# Patient Record
Sex: FEMALE | Race: WHITE | NOT HISPANIC OR LATINO
[De-identification: names, ages, dates, MRNs, and addresses within clinical notes are randomized per-mention and may not be internally consistent; named-entity substitution may affect disease eponyms.]

---

## 2017-05-10 ENCOUNTER — APPOINTMENT (OUTPATIENT)
Dept: FAMILY MEDICINE | Facility: CLINIC | Age: 82
End: 2017-05-10

## 2017-05-10 VITALS
OXYGEN SATURATION: 99 % | DIASTOLIC BLOOD PRESSURE: 72 MMHG | SYSTOLIC BLOOD PRESSURE: 122 MMHG | HEART RATE: 78 BPM | WEIGHT: 156 LBS | BODY MASS INDEX: 25.07 KG/M2 | HEIGHT: 66 IN | RESPIRATION RATE: 16 BRPM

## 2017-05-17 ENCOUNTER — LABORATORY RESULT (OUTPATIENT)
Age: 82
End: 2017-05-17

## 2017-05-18 LAB
25(OH)D3 SERPL-MCNC: 38.8 NG/ML
ALBUMIN SERPL ELPH-MCNC: 4.3 G/DL
ALP BLD-CCNC: 69 U/L
ALT SERPL-CCNC: 21 U/L
ANION GAP SERPL CALC-SCNC: 12 MMOL/L
APPEARANCE: CLEAR
AST SERPL-CCNC: 25 U/L
BASOPHILS # BLD AUTO: 0.03 K/UL
BASOPHILS NFR BLD AUTO: 0.6 %
BILIRUB SERPL-MCNC: 0.4 MG/DL
BILIRUBIN URINE: NEGATIVE
BLOOD URINE: NEGATIVE
BUN SERPL-MCNC: 16 MG/DL
CALCIUM SERPL-MCNC: 9.4 MG/DL
CHLORIDE SERPL-SCNC: 101 MMOL/L
CHOLEST SERPL-MCNC: 140 MG/DL
CHOLEST/HDLC SERPL: 2.3 RATIO
CO2 SERPL-SCNC: 28 MMOL/L
COLOR: YELLOW
CREAT SERPL-MCNC: 0.8 MG/DL
EOSINOPHIL # BLD AUTO: 0.15 K/UL
EOSINOPHIL NFR BLD AUTO: 2.8 %
GLUCOSE QUALITATIVE U: NORMAL MG/DL
GLUCOSE SERPL-MCNC: 118 MG/DL
HBA1C MFR BLD HPLC: 6.4 %
HCT VFR BLD CALC: 40.9 %
HDLC SERPL-MCNC: 61 MG/DL
HGB BLD-MCNC: 12.9 G/DL
IMM GRANULOCYTES NFR BLD AUTO: 0.2 %
KETONES URINE: NEGATIVE
LDLC SERPL CALC-MCNC: 61 MG/DL
LEUKOCYTE ESTERASE URINE: ABNORMAL
LYMPHOCYTES # BLD AUTO: 1.56 K/UL
LYMPHOCYTES NFR BLD AUTO: 28.9 %
MAN DIFF?: NORMAL
MCHC RBC-ENTMCNC: 29.1 PG
MCHC RBC-ENTMCNC: 31.5 GM/DL
MCV RBC AUTO: 92.1 FL
MONOCYTES # BLD AUTO: 0.32 K/UL
MONOCYTES NFR BLD AUTO: 5.9 %
NEUTROPHILS # BLD AUTO: 3.33 K/UL
NEUTROPHILS NFR BLD AUTO: 61.6 %
NITRITE URINE: NEGATIVE
PH URINE: 6.5
PLATELET # BLD AUTO: 269 K/UL
POTASSIUM SERPL-SCNC: 4.5 MMOL/L
PROT SERPL-MCNC: 7.2 G/DL
PROTEIN URINE: NEGATIVE MG/DL
RBC # BLD: 4.44 M/UL
RBC # FLD: 13.7 %
SODIUM SERPL-SCNC: 141 MMOL/L
SPECIFIC GRAVITY URINE: 1.02
T4 SERPL-MCNC: 6.6 UG/DL
TRIGL SERPL-MCNC: 91 MG/DL
TSH SERPL-ACNC: 2.55 UIU/ML
URATE SERPL-MCNC: 5.7 MG/DL
UROBILINOGEN URINE: NORMAL MG/DL
WBC # FLD AUTO: 5.4 K/UL

## 2017-11-28 ENCOUNTER — NON-APPOINTMENT (OUTPATIENT)
Age: 82
End: 2017-11-28

## 2017-11-28 ENCOUNTER — APPOINTMENT (OUTPATIENT)
Dept: FAMILY MEDICINE | Facility: CLINIC | Age: 82
End: 2017-11-28
Payer: MEDICARE

## 2017-11-28 VITALS
WEIGHT: 163 LBS | HEIGHT: 66 IN | RESPIRATION RATE: 14 BRPM | HEART RATE: 78 BPM | OXYGEN SATURATION: 97 % | BODY MASS INDEX: 26.2 KG/M2 | SYSTOLIC BLOOD PRESSURE: 120 MMHG | DIASTOLIC BLOOD PRESSURE: 70 MMHG

## 2017-11-28 DIAGNOSIS — H61.21 IMPACTED CERUMEN, RIGHT EAR: ICD-10-CM

## 2017-11-28 DIAGNOSIS — R09.82 POSTNASAL DRIP: ICD-10-CM

## 2017-11-28 PROCEDURE — 36415 COLL VENOUS BLD VENIPUNCTURE: CPT

## 2017-11-28 PROCEDURE — G0439: CPT

## 2017-11-28 PROCEDURE — 99214 OFFICE O/P EST MOD 30 MIN: CPT | Mod: 25

## 2017-11-28 PROCEDURE — 93000 ELECTROCARDIOGRAM COMPLETE: CPT | Mod: 59

## 2017-11-28 PROCEDURE — 69209 REMOVE IMPACTED EAR WAX UNI: CPT

## 2017-11-29 ENCOUNTER — RESULT REVIEW (OUTPATIENT)
Age: 82
End: 2017-11-29

## 2017-11-30 LAB
ALBUMIN SERPL ELPH-MCNC: 4.3 G/DL
ALP BLD-CCNC: 85 U/L
ALT SERPL-CCNC: 26 U/L
ANION GAP SERPL CALC-SCNC: 13 MMOL/L
APPEARANCE: CLEAR
AST SERPL-CCNC: 21 U/L
BASOPHILS # BLD AUTO: 0.03 K/UL
BASOPHILS NFR BLD AUTO: 0.5 %
BILIRUB SERPL-MCNC: 0.4 MG/DL
BILIRUBIN URINE: NEGATIVE
BLOOD URINE: NEGATIVE
BUN SERPL-MCNC: 14 MG/DL
CALCIUM SERPL-MCNC: 9.7 MG/DL
CHLORIDE SERPL-SCNC: 99 MMOL/L
CHOLEST SERPL-MCNC: 163 MG/DL
CHOLEST/HDLC SERPL: 2.5 RATIO
CO2 SERPL-SCNC: 29 MMOL/L
COLOR: YELLOW
CREAT SERPL-MCNC: 0.91 MG/DL
EOSINOPHIL # BLD AUTO: 0.19 K/UL
EOSINOPHIL NFR BLD AUTO: 3 %
GLUCOSE QUALITATIVE U: NEGATIVE MG/DL
GLUCOSE SERPL-MCNC: 140 MG/DL
HBA1C MFR BLD HPLC: 6.3 %
HCT VFR BLD CALC: 43.3 %
HDLC SERPL-MCNC: 65 MG/DL
HGB BLD-MCNC: 13.6 G/DL
IMM GRANULOCYTES NFR BLD AUTO: 0.2 %
KETONES URINE: NEGATIVE
LDLC SERPL CALC-MCNC: 75 MG/DL
LEUKOCYTE ESTERASE URINE: NEGATIVE
LYMPHOCYTES # BLD AUTO: 1.75 K/UL
LYMPHOCYTES NFR BLD AUTO: 27.9 %
MAN DIFF?: NORMAL
MCHC RBC-ENTMCNC: 30 PG
MCHC RBC-ENTMCNC: 31.4 GM/DL
MCV RBC AUTO: 95.6 FL
MONOCYTES # BLD AUTO: 0.43 K/UL
MONOCYTES NFR BLD AUTO: 6.9 %
NEUTROPHILS # BLD AUTO: 3.86 K/UL
NEUTROPHILS NFR BLD AUTO: 61.5 %
NITRITE URINE: NEGATIVE
PH URINE: 7.5
PLATELET # BLD AUTO: 273 K/UL
POTASSIUM SERPL-SCNC: 4.8 MMOL/L
PROT SERPL-MCNC: 7.7 G/DL
PROTEIN URINE: NEGATIVE MG/DL
RBC # BLD: 4.53 M/UL
RBC # FLD: 14.5 %
SODIUM SERPL-SCNC: 141 MMOL/L
SPECIFIC GRAVITY URINE: 1.01
TRIGL SERPL-MCNC: 117 MG/DL
UROBILINOGEN URINE: NEGATIVE MG/DL
WBC # FLD AUTO: 6.27 K/UL

## 2018-07-09 ENCOUNTER — APPOINTMENT (OUTPATIENT)
Dept: FAMILY MEDICINE | Facility: CLINIC | Age: 83
End: 2018-07-09
Payer: MEDICARE

## 2018-07-09 VITALS
WEIGHT: 149 LBS | RESPIRATION RATE: 16 BRPM | TEMPERATURE: 98.2 F | DIASTOLIC BLOOD PRESSURE: 64 MMHG | OXYGEN SATURATION: 97 % | HEART RATE: 96 BPM | SYSTOLIC BLOOD PRESSURE: 114 MMHG | BODY MASS INDEX: 23.95 KG/M2 | HEIGHT: 66 IN

## 2018-07-09 DIAGNOSIS — J18.9 PNEUMONIA, UNSPECIFIED ORGANISM: ICD-10-CM

## 2018-07-09 DIAGNOSIS — N95.0 POSTMENOPAUSAL BLEEDING: ICD-10-CM

## 2018-07-09 PROCEDURE — 99495 TRANSJ CARE MGMT MOD F2F 14D: CPT

## 2018-07-09 NOTE — HISTORY OF PRESENT ILLNESS
[Post-hospitalization from ___ Hospital] : Post-hospitalization from [unfilled] Hospital [Admitted on: ___] : The patient was admitted on [unfilled] [Discharged on ___] : discharged on [unfilled] [Discharge Summary] : discharge summary [Patient Contacted By: ____] : and contacted by [unfilled] [FreeTextEntry2] : admitted with fever, noted to have Pneumonia? suspicious for malignancy. Seen by Pulmonologist Dr Vergara today. Probably pneumonia, will rpt CT in 4 weeks. completed antibiotics\par Bacterial vaginosis treated in hospital. Bloody vaginal discharge has appointment with DR Pierre.

## 2018-08-06 ENCOUNTER — RX RENEWAL (OUTPATIENT)
Age: 83
End: 2018-08-06

## 2018-08-13 ENCOUNTER — RX RENEWAL (OUTPATIENT)
Age: 83
End: 2018-08-13

## 2018-08-14 ENCOUNTER — RX RENEWAL (OUTPATIENT)
Age: 83
End: 2018-08-14

## 2018-09-13 ENCOUNTER — APPOINTMENT (OUTPATIENT)
Dept: FAMILY MEDICINE | Facility: CLINIC | Age: 83
End: 2018-09-13
Payer: MEDICARE

## 2018-09-13 VITALS
BODY MASS INDEX: 24.91 KG/M2 | WEIGHT: 155 LBS | SYSTOLIC BLOOD PRESSURE: 140 MMHG | RESPIRATION RATE: 16 BRPM | OXYGEN SATURATION: 98 % | HEIGHT: 66 IN | HEART RATE: 100 BPM | DIASTOLIC BLOOD PRESSURE: 70 MMHG

## 2018-09-13 DIAGNOSIS — M79.652 PAIN IN LEFT THIGH: ICD-10-CM

## 2018-09-13 PROCEDURE — 36415 COLL VENOUS BLD VENIPUNCTURE: CPT

## 2018-09-13 PROCEDURE — 99214 OFFICE O/P EST MOD 30 MIN: CPT | Mod: 25

## 2018-09-13 RX ORDER — FLUTICASONE PROPIONATE 50 UG/1
50 SPRAY, METERED NASAL
Qty: 1 | Refills: 1 | Status: DISCONTINUED | COMMUNITY
Start: 2017-11-28 | End: 2018-09-13

## 2018-09-13 NOTE — HISTORY OF PRESENT ILLNESS
[Paroxysmal] : paroxysmal [Moderate] : moderate [Heat] : heat [At Night] : at night [Stable] : stable [de-identified] : left thigh pain [FreeTextEntry8] : Left thigh cramping for 1 month. Seen by Chiropractor not improving.\par Increases at night\par

## 2018-09-13 NOTE — PHYSICAL EXAM

## 2018-09-13 NOTE — REVIEW OF SYSTEMS
[Joint Stiffness] : no joint stiffness [Joint Swelling] : no joint swelling [Muscle Weakness] : no muscle weakness [Muscle Pain] : muscle pain [Back Pain] : back pain [Negative] : Heme/Lymph

## 2018-09-17 LAB
ALBUMIN SERPL ELPH-MCNC: 4.2 G/DL
ANION GAP SERPL CALC-SCNC: 11 MMOL/L
BUN SERPL-MCNC: 15 MG/DL
CALCIUM SERPL-MCNC: 9.6 MG/DL
CHLORIDE SERPL-SCNC: 101 MMOL/L
CO2 SERPL-SCNC: 27 MMOL/L
CREAT SERPL-MCNC: 0.9 MG/DL
GLUCOSE SERPL-MCNC: 193 MG/DL
PHOSPHATE SERPL-MCNC: 3.5 MG/DL
POTASSIUM SERPL-SCNC: 4.5 MMOL/L
SODIUM SERPL-SCNC: 139 MMOL/L

## 2018-11-01 ENCOUNTER — RX RENEWAL (OUTPATIENT)
Age: 83
End: 2018-11-01

## 2018-12-03 ENCOUNTER — NON-APPOINTMENT (OUTPATIENT)
Age: 83
End: 2018-12-03

## 2018-12-03 ENCOUNTER — LABORATORY RESULT (OUTPATIENT)
Age: 83
End: 2018-12-03

## 2018-12-03 ENCOUNTER — APPOINTMENT (OUTPATIENT)
Dept: FAMILY MEDICINE | Facility: CLINIC | Age: 83
End: 2018-12-03
Payer: MEDICARE

## 2018-12-03 VITALS
DIASTOLIC BLOOD PRESSURE: 76 MMHG | SYSTOLIC BLOOD PRESSURE: 118 MMHG | BODY MASS INDEX: 20.41 KG/M2 | RESPIRATION RATE: 16 BRPM | WEIGHT: 127 LBS | HEART RATE: 83 BPM | TEMPERATURE: 97.9 F | OXYGEN SATURATION: 96 % | HEIGHT: 66 IN

## 2018-12-03 LAB — CYTOLOGY CVX/VAG DOC THIN PREP: NORMAL

## 2018-12-03 PROCEDURE — G0439: CPT

## 2018-12-03 PROCEDURE — G0444 DEPRESSION SCREEN ANNUAL: CPT | Mod: 59

## 2018-12-03 PROCEDURE — 99215 OFFICE O/P EST HI 40 MIN: CPT

## 2018-12-03 PROCEDURE — 36415 COLL VENOUS BLD VENIPUNCTURE: CPT

## 2018-12-03 PROCEDURE — 93000 ELECTROCARDIOGRAM COMPLETE: CPT | Mod: 59

## 2018-12-03 RX ORDER — GABAPENTIN 300 MG/1
300 CAPSULE ORAL
Qty: 60 | Refills: 0 | Status: DISCONTINUED | COMMUNITY
Start: 2018-09-13 | End: 2018-12-03

## 2018-12-03 NOTE — HEALTH RISK ASSESSMENT
[Excellent] : ~his/her~  mood as  excellent [No falls in past year] : Patient reported no falls in the past year [0] : 2) Feeling down, depressed, or hopeless: Not at all (0) [Patient reported colonoscopy was normal] : Patient reported colonoscopy was normal [HIV Test offered] : HIV Test offered [Hepatitis C test offered] : Hepatitis C test offered [None] : None [Alone] : lives alone [Retired] : retired [Single] : single [Sexually Active] : sexually active [Feels Safe at Home] : Feels safe at home [Fully functional (bathing, dressing, toileting, transferring, walking, feeding)] : Fully functional (bathing, dressing, toileting, transferring, walking, feeding) [Fully functional (using the telephone, shopping, preparing meals, housekeeping, doing laundry, using] : Fully functional and needs no help or supervision to perform IADLs (using the telephone, shopping, preparing meals, housekeeping, doing laundry, using transportation, managing medications and managing finances) [Reports changes in hearing] : Reports changes in hearing [Smoke Detector] : smoke detector [Carbon Monoxide Detector] : carbon monoxide detector [Seat Belt] :  uses seat belt [Sunscreen] : uses sunscreen [Discussed at today's visit] : Advance Directives Discussed at today's visit [Designated Healthcare Proxy] : Designated healthcare proxy [Name: ___] : Health Care Proxy's Name: [unfilled]  [Relationship: ___] : Relationship: [unfilled] [] : No [Change in mental status noted] : No change in mental status noted [Language] : denies difficulty with language [High Risk Behavior] : no high risk behavior [Reports changes in vision] : Reports no changes in vision [TB Exposure] : is not being exposed to tuberculosis [MammogramComments] : not a candidate for screening [PapSmearComments] : not a candidate for screening [ColonoscopyDate] : 2/2014 [de-identified] : uses hearing aide

## 2018-12-03 NOTE — HISTORY OF PRESENT ILLNESS
[FreeTextEntry1] : here for an annual [de-identified] : 83 yr old female is here for an annual\par Knee pain resolved with Naproxen, seen by Ortho Dr Guan\par Seen by GYN for discharge, exam and imaging normal. Breast cancer screening.\par Was advised to see urologist, did not go.\par Seen by Pulmonary Dr Vergara. Annual monitoring

## 2018-12-10 LAB
25(OH)D3 SERPL-MCNC: 29.5 NG/ML
ALBUMIN SERPL ELPH-MCNC: 4.4 G/DL
ALP BLD-CCNC: 89 U/L
ALT SERPL-CCNC: 24 U/L
ANION GAP SERPL CALC-SCNC: 12 MMOL/L
APPEARANCE: CLEAR
AST SERPL-CCNC: 21 U/L
BASOPHILS # BLD AUTO: 0.03 K/UL
BASOPHILS NFR BLD AUTO: 0.4 %
BILIRUB SERPL-MCNC: 0.3 MG/DL
BILIRUBIN URINE: NEGATIVE
BLOOD URINE: NEGATIVE
BUN SERPL-MCNC: 15 MG/DL
CALCIUM SERPL-MCNC: 9.9 MG/DL
CHLORIDE SERPL-SCNC: 99 MMOL/L
CHOLEST SERPL-MCNC: 147 MG/DL
CHOLEST/HDLC SERPL: 2.4 RATIO
CO2 SERPL-SCNC: 27 MMOL/L
COLOR: YELLOW
CREAT SERPL-MCNC: 0.87 MG/DL
EOSINOPHIL # BLD AUTO: 0.18 K/UL
EOSINOPHIL NFR BLD AUTO: 2.2 %
GLUCOSE QUALITATIVE U: NEGATIVE MG/DL
GLUCOSE SERPL-MCNC: 100 MG/DL
HBA1C MFR BLD HPLC: 6.6 %
HCT VFR BLD CALC: 40.9 %
HCV AB SER QL: NONREACTIVE
HCV S/CO RATIO: 0.27 S/CO
HDLC SERPL-MCNC: 62 MG/DL
HGB BLD-MCNC: 13 G/DL
HIV1+2 AB SPEC QL IA.RAPID: NONREACTIVE
IMM GRANULOCYTES NFR BLD AUTO: 0.2 %
KETONES URINE: NEGATIVE
LDLC SERPL CALC-MCNC: 59 MG/DL
LEUKOCYTE ESTERASE URINE: ABNORMAL
LYMPHOCYTES # BLD AUTO: 1.99 K/UL
LYMPHOCYTES NFR BLD AUTO: 24.5 %
MAN DIFF?: NORMAL
MCHC RBC-ENTMCNC: 28.8 PG
MCHC RBC-ENTMCNC: 31.8 GM/DL
MCV RBC AUTO: 90.7 FL
MONOCYTES # BLD AUTO: 0.56 K/UL
MONOCYTES NFR BLD AUTO: 6.9 %
NEUTROPHILS # BLD AUTO: 5.33 K/UL
NEUTROPHILS NFR BLD AUTO: 65.8 %
NITRITE URINE: NEGATIVE
PH URINE: 5.5
PLATELET # BLD AUTO: 354 K/UL
POTASSIUM SERPL-SCNC: 4.3 MMOL/L
PROT SERPL-MCNC: 7.8 G/DL
PROTEIN URINE: NEGATIVE MG/DL
RBC # BLD: 4.51 M/UL
RBC # FLD: 14.1 %
SODIUM SERPL-SCNC: 138 MMOL/L
SPECIFIC GRAVITY URINE: 1.01
T4 SERPL-MCNC: 6.8 UG/DL
TRIGL SERPL-MCNC: 131 MG/DL
TSH SERPL-ACNC: 3 UIU/ML
URATE SERPL-MCNC: 5.4 MG/DL
UROBILINOGEN URINE: NEGATIVE MG/DL
WBC # FLD AUTO: 8.11 K/UL

## 2019-02-07 ENCOUNTER — RX RENEWAL (OUTPATIENT)
Age: 84
End: 2019-02-07

## 2019-04-26 ENCOUNTER — APPOINTMENT (OUTPATIENT)
Dept: FAMILY MEDICINE | Facility: CLINIC | Age: 84
End: 2019-04-26
Payer: MEDICARE

## 2019-04-26 VITALS
OXYGEN SATURATION: 96 % | TEMPERATURE: 98.1 F | RESPIRATION RATE: 16 BRPM | WEIGHT: 156 LBS | DIASTOLIC BLOOD PRESSURE: 70 MMHG | BODY MASS INDEX: 25.07 KG/M2 | HEIGHT: 66 IN | SYSTOLIC BLOOD PRESSURE: 112 MMHG | HEART RATE: 78 BPM

## 2019-04-26 PROCEDURE — 99214 OFFICE O/P EST MOD 30 MIN: CPT

## 2019-04-26 RX ORDER — CHROMIUM 200 MCG
TABLET ORAL
Refills: 0 | Status: ACTIVE | COMMUNITY

## 2019-04-27 NOTE — PHYSICAL EXAM
[No Acute Distress] : no acute distress [Normal Sclera/Conjunctiva] : normal sclera/conjunctiva [Well Developed] : well developed [Well Nourished] : well nourished [EOMI] : extraocular movements intact [PERRL] : pupils equal round and reactive to light [Normal Oropharynx] : the oropharynx was normal [Normal Outer Ear/Nose] : the outer ears and nose were normal in appearance [No Lymphadenopathy] : no lymphadenopathy [Normal TMs] : both tympanic membranes were normal [Supple] : supple [Clear to Auscultation] : lungs were clear to auscultation bilaterally [No Respiratory Distress] : no respiratory distress  [Normal Rate] : normal rate  [No Accessory Muscle Use] : no accessory muscle use [Regular Rhythm] : with a regular rhythm [Normal S1, S2] : normal S1 and S2 [Soft] : abdomen soft [Non Tender] : non-tender [Non-distended] : non-distended [No Rash] : no rash [Normal Bowel Sounds] : normal bowel sounds [Grossly Normal Strength/Tone] : grossly normal strength/tone [Normal Gait] : normal gait [No Focal Deficits] : no focal deficits [Normal Affect] : the affect was normal [Normal Insight/Judgement] : insight and judgment were intact

## 2019-04-27 NOTE — HISTORY OF PRESENT ILLNESS
[FreeTextEntry8] : Patient is here today for an acute visit to discuss blood in her urine. \par She initially thought it was coming from her vagina but she had a check up with her GYN. \par She had a pap, sonogram, etc which did not show anything. \par She saw her GI doctor as well and he was not concerned. \par She was seen by a urologist in December and was not happy with the outcome. He did urine cytology which showed gross blood.  \par She has not had blood for awhile now but still has gas from her vagina.  She occasionally gets brown discharge but this is intermittent. \par No pain with urination or trouble urinating. \par While she was in [place] she went to see a gastroenterologist in March.  She had a CT scan and barium enema.  He found diverticula but nothing that would be the cause for her current symptoms. She is following up with her GI doctor on Monday. \par About one week ago, she had another episode of a clot of blood in the urine and increased air production.  She went to urgent care last week and had urine studies done which showed blood.

## 2019-04-27 NOTE — ASSESSMENT
[FreeTextEntry1] : Hematuria/ gas from urethra\par - symptoms for over one year\par - reviewed results of cytology from urologist - negative for cancer cells\par - reviewed CT of abdomen ordered by GI - incidental hepatic and renal cyst finding\par - going for GI follow up next week and likely repeat colonoscopy\par - recent urgent care visit showed blood in the urine\par - advised patient to follow up with urology for further testing

## 2019-05-13 ENCOUNTER — RX RENEWAL (OUTPATIENT)
Age: 84
End: 2019-05-13

## 2019-05-14 ENCOUNTER — APPOINTMENT (OUTPATIENT)
Dept: COLORECTAL SURGERY | Facility: CLINIC | Age: 84
End: 2019-05-14
Payer: MEDICARE

## 2019-05-14 VITALS
DIASTOLIC BLOOD PRESSURE: 74 MMHG | HEART RATE: 76 BPM | TEMPERATURE: 98.1 F | SYSTOLIC BLOOD PRESSURE: 124 MMHG | HEIGHT: 66 IN | BODY MASS INDEX: 25.07 KG/M2 | WEIGHT: 156 LBS

## 2019-05-14 DIAGNOSIS — K57.92 DIVERTICULITIS OF INTESTINE, PART UNSPECIFIED, W/OUT PERFORATION OR ABSCESS W/OUT BLEEDING: ICD-10-CM

## 2019-05-14 DIAGNOSIS — Z82.49 FAMILY HISTORY OF ISCHEMIC HEART DISEASE AND OTHER DISEASES OF THE CIRCULATORY SYSTEM: ICD-10-CM

## 2019-05-14 DIAGNOSIS — Z82.3 FAMILY HISTORY OF STROKE: ICD-10-CM

## 2019-05-14 DIAGNOSIS — Z87.448 PERSONAL HISTORY OF OTHER DISEASES OF URINARY SYSTEM: ICD-10-CM

## 2019-05-14 PROCEDURE — 99205 OFFICE O/P NEW HI 60 MIN: CPT

## 2019-05-14 NOTE — ASSESSMENT
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 Recommend laparoscopic possible open sigmoid colon resection possible ostomy with repair of colovaginal and colovesical fistulas.Risk and benefits of the surgery have been discussed which include bleeding, infection, sepsis, multiorgan failure, inadvertent injury including hollow viscus, solid organ, ureter neurovascular and neurological structures, DVT PE, heart attack, stroke, hernias, recurrence, leakage of anastomosis requiring ostomy and death.

## 2019-05-14 NOTE — HISTORY OF PRESENT ILLNESS
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 Symptoms and present for several months and worsening denies any fevers or chills nausea or vomiting

## 2019-05-14 NOTE — CONSULT LETTER
[Dear  ___] : Dear  [unfilled], [Consult Letter:] : I had the pleasure of evaluating your patient, [unfilled]. [Please see my note below.] : Please see my note below. [Consult Closing:] : Thank you very much for allowing me to participate in the care of this patient.  If you have any questions, please do not hesitate to contact me. [Sincerely,] : Sincerely, [FreeTextEntry3] : Demetrius Knight M.D. FACS, FASCRS

## 2019-05-14 NOTE — REVIEW OF SYSTEMS
[Abdominal Pain] : abdominal pain [Dysuria] : dysuria [Vaginal Discharge] : vaginal discharge [Pelvic Pain] : pelvic pain [Negative] : Heme/Lymph [FreeTextEntry8] : Pneumaturia

## 2019-05-14 NOTE — PHYSICAL EXAM
[Abdomen Masses] : No abdominal masses [Abdomen Tenderness] : ~T No ~M abdominal tenderness [Tender] : nontender [JVD] : no jugular venous distention  [Normal Breath Sounds] : Normal breath sounds [Normal Heart Sounds] : normal heart sounds [Normal Rate and Rhythm] : normal rate and rhythm [No Rash or Lesion] : No rash or lesion [Alert] : alert [Oriented to Person] : oriented to person [Oriented to Place] : oriented to place [Oriented to Time] : oriented to time [Calm] : calm [de-identified] : Looks well in no distress, of stated age. [de-identified] : moves all 4 extremities appropriately with 5 over 5 strength [de-identified] : pupils equal reactive to light normocephalic atraumatic.

## 2019-05-14 NOTE — DATA REVIEWED
[FreeTextEntry1] : CAT scan demonstrates sigmoid diverticulitis and colovesical and colovaginal fistula

## 2019-05-20 ENCOUNTER — APPOINTMENT (OUTPATIENT)
Dept: FAMILY MEDICINE | Facility: CLINIC | Age: 84
End: 2019-05-20
Payer: MEDICARE

## 2019-05-20 VITALS
SYSTOLIC BLOOD PRESSURE: 110 MMHG | WEIGHT: 158.5 LBS | BODY MASS INDEX: 25.47 KG/M2 | DIASTOLIC BLOOD PRESSURE: 60 MMHG | HEIGHT: 66 IN | OXYGEN SATURATION: 97 % | RESPIRATION RATE: 16 BRPM | HEART RATE: 86 BPM

## 2019-05-20 DIAGNOSIS — N32.1 VESICOINTESTINAL FISTULA: ICD-10-CM

## 2019-05-20 DIAGNOSIS — N82.4 OTHER FEMALE INTESTINAL-GENITAL TRACT FISTULAE: ICD-10-CM

## 2019-05-20 PROCEDURE — G0444 DEPRESSION SCREEN ANNUAL: CPT | Mod: 59

## 2019-05-20 PROCEDURE — 99214 OFFICE O/P EST MOD 30 MIN: CPT | Mod: 25

## 2019-06-04 ENCOUNTER — OUTPATIENT (OUTPATIENT)
Dept: OUTPATIENT SERVICES | Facility: HOSPITAL | Age: 84
LOS: 1 days | Discharge: HOME | End: 2019-06-04
Payer: MEDICARE

## 2019-06-04 VITALS
OXYGEN SATURATION: 97 % | RESPIRATION RATE: 16 BRPM | DIASTOLIC BLOOD PRESSURE: 66 MMHG | HEIGHT: 66 IN | WEIGHT: 155.43 LBS | SYSTOLIC BLOOD PRESSURE: 134 MMHG | TEMPERATURE: 97 F | HEART RATE: 64 BPM

## 2019-06-04 DIAGNOSIS — Z90.710 ACQUIRED ABSENCE OF BOTH CERVIX AND UTERUS: Chronic | ICD-10-CM

## 2019-06-04 DIAGNOSIS — Z98.890 OTHER SPECIFIED POSTPROCEDURAL STATES: Chronic | ICD-10-CM

## 2019-06-04 DIAGNOSIS — K57.92 DIVERTICULITIS OF INTESTINE, PART UNSPECIFIED, WITHOUT PERFORATION OR ABSCESS WITHOUT BLEEDING: ICD-10-CM

## 2019-06-04 DIAGNOSIS — Z90.49 ACQUIRED ABSENCE OF OTHER SPECIFIED PARTS OF DIGESTIVE TRACT: Chronic | ICD-10-CM

## 2019-06-04 DIAGNOSIS — N82.4 OTHER FEMALE INTESTINAL-GENITAL TRACT FISTULAE: ICD-10-CM

## 2019-06-04 DIAGNOSIS — N32.1 VESICOINTESTINAL FISTULA: ICD-10-CM

## 2019-06-04 DIAGNOSIS — Z41.9 ENCOUNTER FOR PROCEDURE FOR PURPOSES OTHER THAN REMEDYING HEALTH STATE, UNSPECIFIED: Chronic | ICD-10-CM

## 2019-06-04 LAB
ABO RH CONFIRMATION: SIGNIFICANT CHANGE UP
ALBUMIN SERPL ELPH-MCNC: 3.7 G/DL — SIGNIFICANT CHANGE UP (ref 3.3–5)
ALP SERPL-CCNC: 86 U/L — SIGNIFICANT CHANGE UP (ref 40–120)
ALT FLD-CCNC: 36 U/L — SIGNIFICANT CHANGE UP (ref 12–78)
ANION GAP SERPL CALC-SCNC: 5 MMOL/L — SIGNIFICANT CHANGE UP (ref 5–17)
APTT BLD: 31 SEC — SIGNIFICANT CHANGE UP (ref 27.5–36.3)
AST SERPL-CCNC: 20 U/L — SIGNIFICANT CHANGE UP (ref 15–37)
BASOPHILS # BLD AUTO: 0.05 K/UL — SIGNIFICANT CHANGE UP (ref 0–0.2)
BASOPHILS NFR BLD AUTO: 0.9 % — SIGNIFICANT CHANGE UP (ref 0–2)
BILIRUB DIRECT SERPL-MCNC: <0.1 MG/DL — SIGNIFICANT CHANGE UP (ref 0–0.2)
BILIRUB SERPL-MCNC: 0.5 MG/DL — SIGNIFICANT CHANGE UP (ref 0.2–1.2)
BLD GP AB SCN SERPL QL: SIGNIFICANT CHANGE UP
BUN SERPL-MCNC: 14 MG/DL — SIGNIFICANT CHANGE UP (ref 7–23)
CALCIUM SERPL-MCNC: 9.7 MG/DL — SIGNIFICANT CHANGE UP (ref 8.5–10.1)
CEA SERPL-MCNC: 2.9 NG/ML — SIGNIFICANT CHANGE UP (ref 0–3.8)
CHLORIDE SERPL-SCNC: 103 MMOL/L — SIGNIFICANT CHANGE UP (ref 96–108)
CO2 SERPL-SCNC: 31 MMOL/L — SIGNIFICANT CHANGE UP (ref 22–31)
CREAT SERPL-MCNC: 0.83 MG/DL — SIGNIFICANT CHANGE UP (ref 0.5–1.3)
EOSINOPHIL # BLD AUTO: 0.11 K/UL — SIGNIFICANT CHANGE UP (ref 0–0.5)
EOSINOPHIL NFR BLD AUTO: 1.9 % — SIGNIFICANT CHANGE UP (ref 0–6)
GLUCOSE SERPL-MCNC: 78 MG/DL — SIGNIFICANT CHANGE UP (ref 70–99)
HBA1C BLD-MCNC: 6.4 % — HIGH (ref 4–5.6)
HCT VFR BLD CALC: 41.5 % — SIGNIFICANT CHANGE UP (ref 34.5–45)
HGB BLD-MCNC: 13.4 G/DL — SIGNIFICANT CHANGE UP (ref 11.5–15.5)
IMM GRANULOCYTES NFR BLD AUTO: 0.3 % — SIGNIFICANT CHANGE UP (ref 0–1.5)
INR BLD: 1.03 RATIO — SIGNIFICANT CHANGE UP (ref 0.88–1.16)
LYMPHOCYTES # BLD AUTO: 1.39 K/UL — SIGNIFICANT CHANGE UP (ref 1–3.3)
LYMPHOCYTES # BLD AUTO: 23.6 % — SIGNIFICANT CHANGE UP (ref 13–44)
MCHC RBC-ENTMCNC: 29.8 PG — SIGNIFICANT CHANGE UP (ref 27–34)
MCHC RBC-ENTMCNC: 32.3 GM/DL — SIGNIFICANT CHANGE UP (ref 32–36)
MCV RBC AUTO: 92.4 FL — SIGNIFICANT CHANGE UP (ref 80–100)
MONOCYTES # BLD AUTO: 0.51 K/UL — SIGNIFICANT CHANGE UP (ref 0–0.9)
MONOCYTES NFR BLD AUTO: 8.7 % — SIGNIFICANT CHANGE UP (ref 2–14)
NEUTROPHILS # BLD AUTO: 3.8 K/UL — SIGNIFICANT CHANGE UP (ref 1.8–7.4)
NEUTROPHILS NFR BLD AUTO: 64.6 % — SIGNIFICANT CHANGE UP (ref 43–77)
PLATELET # BLD AUTO: 267 K/UL — SIGNIFICANT CHANGE UP (ref 150–400)
POTASSIUM SERPL-MCNC: 4.1 MMOL/L — SIGNIFICANT CHANGE UP (ref 3.5–5.3)
POTASSIUM SERPL-SCNC: 4.1 MMOL/L — SIGNIFICANT CHANGE UP (ref 3.5–5.3)
PROT SERPL-MCNC: 7.8 GM/DL — SIGNIFICANT CHANGE UP (ref 6–8.3)
PROTHROM AB SERPL-ACNC: 11.5 SEC — SIGNIFICANT CHANGE UP (ref 10–12.9)
RBC # BLD: 4.49 M/UL — SIGNIFICANT CHANGE UP (ref 3.8–5.2)
RBC # FLD: 13.1 % — SIGNIFICANT CHANGE UP (ref 10.3–14.5)
SODIUM SERPL-SCNC: 139 MMOL/L — SIGNIFICANT CHANGE UP (ref 135–145)
TYPE + AB SCN PNL BLD: SIGNIFICANT CHANGE UP
WBC # BLD: 5.88 K/UL — SIGNIFICANT CHANGE UP (ref 3.8–10.5)
WBC # FLD AUTO: 5.88 K/UL — SIGNIFICANT CHANGE UP (ref 3.8–10.5)

## 2019-06-04 PROCEDURE — 71046 X-RAY EXAM CHEST 2 VIEWS: CPT | Mod: 26

## 2019-06-04 PROCEDURE — 93010 ELECTROCARDIOGRAM REPORT: CPT

## 2019-06-04 NOTE — H&P PST ADULT - NSICDXPASTMEDICALHX_GEN_ALL_CORE_FT
PAST MEDICAL HISTORY:  Abnormal EKG     Constipation     Diverticulosis     HLD (hyperlipidemia)     HTN (hypertension)

## 2019-06-04 NOTE — H&P PST ADULT - NSICDXPASTSURGICALHX_GEN_ALL_CORE_FT
PAST SURGICAL HISTORY:  Elective surgery Left meniscus repair 2015    H/O colonoscopy 2015 was negative    H/O cystoscopy 12/2018    History of cholecystectomy about 2013    S/P hysterectomy 1974

## 2019-06-04 NOTE — H&P PST ADULT - HISTORY OF PRESENT ILLNESS
This is a 85 y/o female with a PMH of HTN and HLD who reports for the last year she has had vaginal or urethral discharge and passing of gas through her urethra.  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 She is now scheduled for a laparoscopic possible open sigmoid colon resection and repair of colovaginal and colovesical fistulas. She denies any abdominal pain, N/V/D but does have a hx of constipation Denies any blood in her stool. This is a 83 y/o female with a PMH of HTN and HLD who reports for the last year she has had vaginal or urethral discharge and passing of gas through her urethra.  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 She is now scheduled for a laparoscopic possible open sigmoid colon resection and repair of colovaginal and colovesical fistulas. She denies any abdominal pain, N/V/D but does have a hx of constipation. Denies any blood in her stool.

## 2019-06-04 NOTE — H&P PST ADULT - NSICDXFAMILYHX_GEN_ALL_CORE_FT
FAMILY HISTORY:  FH: heart failure, mother who passed away at age 93  FH: lung cancer, father who passed away at age 76

## 2019-06-04 NOTE — H&P PST ADULT - ASSESSMENT
This is an 85 y/o female a colovaginal and colovesical fistulas who is scheduled for a laparoscopic possible open sigmoid colon resection repair of colovaginal and colovesical fistulas     Patient instructed on     1. NPO post midnight of surgery  2. On the use of EZ sponges  3. Aware that she needs medical clearance (reports she had clearance with PCP (Dr. Reyes) and cardiologist (Dr. Welch))   4. May take Metoprolol with a sip of water on morning of procedure     CAPRINI SCORE    AGE RELATED RISK FACTORS                                                       MOBILITY RELATED FACTORS  [ ] Age 41-60 years                                            (1 Point)                  [ ] Bed rest                                                        (1 Point)  [ ] Age: 61-74 years                                           (2 Points)                [ ] Plaster cast                                                   (2 Points)  X  Age= 75 years                                              (3 Points)                 [ ] Bed bound for more than 72 hours                   (2 Points)    DISEASE RELATED RISK FACTORS                                               GENDER SPECIFIC FACTORS  [ ] Edema in the lower extremities                       (1 Point)                  [ ] Pregnancy                                                     (1 Point)  [ ] Varicose veins                                               (1 Point)                  [ ] Post-partum < 6 weeks                                   (1 Point)             X  BMI > 25 Kg/m2                                            (1 Point)                  [ ] Hormonal therapy  or oral contraception            (1 Point)                 [ ] Sepsis (in the previous month)                        (1 Point)                  [ ] History of pregnancy complications  [ ] Pneumonia or serious lung disease                                               [ ] Unexplained or recurrent                       (1 Point)           (in the previous month)                               (1 Point)  [ ] Abnormal pulmonary function test                     (1 Point)                 SURGERY RELATED RISK FACTORS  [ ] Acute myocardial infarction                              (1 Point)                 [ ]  Section                                            (1 Point)  [ ] Congestive heart failure (in the previous month)  (1 Point)                 [ ] Minor surgery                                                 (1 Point)   [ ] Inflammatory bowel disease                             (1 Point)                 [ ] Arthroscopic surgery                                        (2 Points)  [ ] Central venous access                                    (2 Points)                 X  General surgery lasting more than 45 minutes   (2 Points)       [ ] Stroke (in the previous month)                          (5 Points)               [ ] Elective arthroplasty                                        (5 Points)            [ ] malignancy                                                             (2 points)                                                                                                                                 HEMATOLOGY RELATED FACTORS                                                 TRAUMA RELATED RISK FACTORS  [ ] Prior episodes of VTE                                     (3 Points)                 [ ] Fracture of the hip, pelvis, or leg                       (5 Points)  [ ] Positive family history for VTE                         (3 Points)                 [ ] Acute spinal cord injury (in the previous month)  (5 Points)  [ ] Prothrombin 25681 A                                      (3 Points)                 [ ] Paralysis  (less than 1 month)                          (5 Points)  [ ] Factor V Leiden                                             (3 Points)                 [ ] Multiple Trauma within 1 month                         (5 Points)  [ ] Lupus anticoagulants                                     (3 Points)                                                           [ ] Anticardiolipin antibodies                                (3 Points)                                                       [ ] High homocysteine in the blood                      (3 Points)                                             [ ] Other congenital or acquired thrombophilia       (3 Points)                                                [ ] Heparin induced thrombocytopenia                  (3 Points)                                          Total Score: 6 This is an 85 y/o female a colovaginal and colovesical fistulas who is scheduled for a laparoscopic possible open sigmoid colon resection repair of colovaginal and colovesical fistulas     Patient instructed on     1. NPO post midnight of surgery  2. On the use of EZ sponges  3. Aware that she needs medical clearance (reports she had clearance with PCP (Dr. Reyes) and cardiologist (Dr. Welch))   4. May take Metoprolol with a sip of water on morning of procedure   5. Pt to ask her PCP (Dr. Reyes) or surgeon (Dr. Saha) if she needs to hold her low dose baby aspirin prior to procedure     CAPRINI SCORE    AGE RELATED RISK FACTORS                                                       MOBILITY RELATED FACTORS  [ ] Age 41-60 years                                            (1 Point)                  [ ] Bed rest                                                        (1 Point)  [ ] Age: 61-74 years                                           (2 Points)                [ ] Plaster cast                                                   (2 Points)  X  Age= 75 years                                              (3 Points)                 [ ] Bed bound for more than 72 hours                   (2 Points)    DISEASE RELATED RISK FACTORS                                               GENDER SPECIFIC FACTORS  [ ] Edema in the lower extremities                       (1 Point)                  [ ] Pregnancy                                                     (1 Point)  [ ] Varicose veins                                               (1 Point)                  [ ] Post-partum < 6 weeks                                   (1 Point)             X  BMI > 25 Kg/m2                                            (1 Point)                  [ ] Hormonal therapy  or oral contraception            (1 Point)                 [ ] Sepsis (in the previous month)                        (1 Point)                  [ ] History of pregnancy complications  [ ] Pneumonia or serious lung disease                                               [ ] Unexplained or recurrent                       (1 Point)           (in the previous month)                               (1 Point)  [ ] Abnormal pulmonary function test                     (1 Point)                 SURGERY RELATED RISK FACTORS  [ ] Acute myocardial infarction                              (1 Point)                 [ ]  Section                                            (1 Point)  [ ] Congestive heart failure (in the previous month)  (1 Point)                 [ ] Minor surgery                                                 (1 Point)   [ ] Inflammatory bowel disease                             (1 Point)                 [ ] Arthroscopic surgery                                        (2 Points)  [ ] Central venous access                                    (2 Points)                 X  General surgery lasting more than 45 minutes   (2 Points)       [ ] Stroke (in the previous month)                          (5 Points)               [ ] Elective arthroplasty                                        (5 Points)            [ ] malignancy                                                             (2 points)                                                                                                                                 HEMATOLOGY RELATED FACTORS                                                 TRAUMA RELATED RISK FACTORS  [ ] Prior episodes of VTE                                     (3 Points)                 [ ] Fracture of the hip, pelvis, or leg                       (5 Points)  [ ] Positive family history for VTE                         (3 Points)                 [ ] Acute spinal cord injury (in the previous month)  (5 Points)  [ ] Prothrombin 94525 A                                      (3 Points)                 [ ] Paralysis  (less than 1 month)                          (5 Points)  [ ] Factor V Leiden                                             (3 Points)                 [ ] Multiple Trauma within 1 month                         (5 Points)  [ ] Lupus anticoagulants                                     (3 Points)                                                           [ ] Anticardiolipin antibodies                                (3 Points)                                                       [ ] High homocysteine in the blood                      (3 Points)                                             [ ] Other congenital or acquired thrombophilia       (3 Points)                                                [ ] Heparin induced thrombocytopenia                  (3 Points)                                          Total Score: 6

## 2019-06-07 RX ORDER — SODIUM CHLORIDE 9 MG/ML
3 INJECTION INTRAMUSCULAR; INTRAVENOUS; SUBCUTANEOUS EVERY 8 HOURS
Refills: 0 | Status: DISCONTINUED | OUTPATIENT
Start: 2019-06-12 | End: 2019-06-16

## 2019-06-07 RX ORDER — HEPARIN SODIUM 5000 [USP'U]/ML
5000 INJECTION INTRAVENOUS; SUBCUTANEOUS ONCE
Refills: 0 | Status: COMPLETED | OUTPATIENT
Start: 2019-06-12 | End: 2019-06-12

## 2019-06-10 NOTE — ASSESSMENT
[FreeTextEntry4] : Pt is medically optimized for procedure at this time. Pt has hx of nonobstructive CAD, medically managed. Pt has obtained cardiac clearance from her cardiologist Dr. Welch. Pt has hx of HTN, montior BP perioperatively. [Patient Optimized for Surgery] : Patient optimized for surgery

## 2019-06-10 NOTE — HISTORY OF PRESENT ILLNESS
[Coronary Artery Disease] : coronary artery disease [No Pertinent Pulmonary History] : no history of asthma, COPD, sleep apnea, or smoking [No Adverse Anesthesia Reaction] : no adverse anesthesia reaction in self or family member [Diabetes] : diabetes [(Patient denies any chest pain, claudication, dyspnea on exertion, orthopnea, palpitations or syncope)] : Patient denies any chest pain, claudication, dyspnea on exertion, orthopnea, palpitations or syncope [Good (7-10 METs)] : Good (7-10 METs) [FreeTextEntry1] : Laparoscopic possible open sigmoid colon resection possible ostomy repair of colovaginal and vesicovaginal fistula [FreeTextEntry2] : 6/12/19 [FreeTextEntry3] : Antonia [FreeTextEntry4] : Pt in office for medical clearance. Pt to go for procedure on 6/12/19, to be performed by Dr. Knight. Pt denies chest pain, palpitations, dyspnea, fever, chills, nausea, or vomiting. Pt has hx of CAD, pt to see cardio Dr. Nata Welch. \par PSTs to be done in coming weeks.

## 2019-06-10 NOTE — RESULTS/DATA
[] : results reviewed [de-identified] : EKG- NSR 63bpm LAD, RBBB, nonspecific T wave inversions [de-identified] : hba1c 6.4

## 2019-06-11 ENCOUNTER — OTHER (OUTPATIENT)
Age: 84
End: 2019-06-11

## 2019-06-11 RX ORDER — FAMOTIDINE 10 MG/ML
20 INJECTION INTRAVENOUS ONCE
Refills: 0 | Status: COMPLETED | OUTPATIENT
Start: 2019-06-12 | End: 2019-06-12

## 2019-06-11 RX ORDER — SODIUM CHLORIDE 9 MG/ML
3 INJECTION INTRAMUSCULAR; INTRAVENOUS; SUBCUTANEOUS EVERY 8 HOURS
Refills: 0 | Status: DISCONTINUED | OUTPATIENT
Start: 2019-06-12 | End: 2019-06-16

## 2019-06-11 RX ORDER — ACETAMINOPHEN 500 MG
975 TABLET ORAL ONCE
Refills: 0 | Status: COMPLETED | OUTPATIENT
Start: 2019-06-12 | End: 2019-06-12

## 2019-06-12 ENCOUNTER — INPATIENT (INPATIENT)
Facility: HOSPITAL | Age: 84
LOS: 3 days | Discharge: HOME | End: 2019-06-16
Attending: COLON & RECTAL SURGERY | Admitting: COLON & RECTAL SURGERY
Payer: MEDICARE

## 2019-06-12 ENCOUNTER — RESULT REVIEW (OUTPATIENT)
Age: 84
End: 2019-06-12

## 2019-06-12 ENCOUNTER — APPOINTMENT (OUTPATIENT)
Dept: COLORECTAL SURGERY | Facility: HOSPITAL | Age: 84
End: 2019-06-12
Payer: MEDICARE

## 2019-06-12 VITALS
OXYGEN SATURATION: 98 % | HEIGHT: 66 IN | WEIGHT: 257.94 LBS | DIASTOLIC BLOOD PRESSURE: 59 MMHG | RESPIRATION RATE: 16 BRPM | HEART RATE: 79 BPM | SYSTOLIC BLOOD PRESSURE: 129 MMHG | TEMPERATURE: 98 F

## 2019-06-12 DIAGNOSIS — Z90.710 ACQUIRED ABSENCE OF BOTH CERVIX AND UTERUS: Chronic | ICD-10-CM

## 2019-06-12 DIAGNOSIS — Z41.9 ENCOUNTER FOR PROCEDURE FOR PURPOSES OTHER THAN REMEDYING HEALTH STATE, UNSPECIFIED: Chronic | ICD-10-CM

## 2019-06-12 DIAGNOSIS — Z98.890 OTHER SPECIFIED POSTPROCEDURAL STATES: Chronic | ICD-10-CM

## 2019-06-12 DIAGNOSIS — Z90.49 ACQUIRED ABSENCE OF OTHER SPECIFIED PARTS OF DIGESTIVE TRACT: Chronic | ICD-10-CM

## 2019-06-12 LAB
GLUCOSE BLDC GLUCOMTR-MCNC: 104 MG/DL — HIGH (ref 70–99)
GLUCOSE BLDC GLUCOMTR-MCNC: 114 MG/DL — HIGH (ref 70–99)
GLUCOSE BLDC GLUCOMTR-MCNC: 130 MG/DL — HIGH (ref 70–99)
GLUCOSE BLDC GLUCOMTR-MCNC: 142 MG/DL — HIGH (ref 70–99)

## 2019-06-12 PROCEDURE — 88307 TISSUE EXAM BY PATHOLOGIST: CPT | Mod: 26

## 2019-06-12 PROCEDURE — 88304 TISSUE EXAM BY PATHOLOGIST: CPT | Mod: 26

## 2019-06-12 PROCEDURE — 44207 L COLECTOMY/COLOPROCTOSTOMY: CPT

## 2019-06-12 PROCEDURE — 44661 REPAIR BOWEL-BLADDER FISTULA: CPT | Mod: AS

## 2019-06-12 PROCEDURE — 44213 LAP MOBIL SPLENIC FL ADD-ON: CPT | Mod: AS

## 2019-06-12 PROCEDURE — 44661 REPAIR BOWEL-BLADDER FISTULA: CPT

## 2019-06-12 PROCEDURE — 49020 DRAINAGE ABDOM ABSCESS OPEN: CPT | Mod: 59

## 2019-06-12 PROCEDURE — 45300 PROCTOSIGMOIDOSCOPY DX: CPT

## 2019-06-12 PROCEDURE — 49020 DRAINAGE ABDOM ABSCESS OPEN: CPT | Mod: AS,59

## 2019-06-12 PROCEDURE — 44213 LAP MOBIL SPLENIC FL ADD-ON: CPT

## 2019-06-12 PROCEDURE — 44207 L COLECTOMY/COLOPROCTOSTOMY: CPT | Mod: AS

## 2019-06-12 RX ORDER — CEFOTETAN DISODIUM 1 G
2 VIAL (EA) INJECTION ONCE
Refills: 0 | Status: COMPLETED | OUTPATIENT
Start: 2019-06-13 | End: 2019-06-13

## 2019-06-12 RX ORDER — ATORVASTATIN CALCIUM 80 MG/1
1 TABLET, FILM COATED ORAL
Qty: 0 | Refills: 0 | DISCHARGE

## 2019-06-12 RX ORDER — ACETAMINOPHEN 500 MG
1000 TABLET ORAL EVERY 6 HOURS
Refills: 0 | Status: COMPLETED | OUTPATIENT
Start: 2019-06-13 | End: 2019-06-13

## 2019-06-12 RX ORDER — ONDANSETRON 8 MG/1
4 TABLET, FILM COATED ORAL EVERY 6 HOURS
Refills: 0 | Status: DISCONTINUED | OUTPATIENT
Start: 2019-06-12 | End: 2019-06-16

## 2019-06-12 RX ORDER — ATORVASTATIN CALCIUM 80 MG/1
20 TABLET, FILM COATED ORAL AT BEDTIME
Refills: 0 | Status: DISCONTINUED | OUTPATIENT
Start: 2019-06-12 | End: 2019-06-16

## 2019-06-12 RX ORDER — SODIUM CHLORIDE 9 MG/ML
1000 INJECTION, SOLUTION INTRAVENOUS
Refills: 0 | Status: DISCONTINUED | OUTPATIENT
Start: 2019-06-12 | End: 2019-06-14

## 2019-06-12 RX ORDER — METOPROLOL TARTRATE 50 MG
1 TABLET ORAL
Qty: 0 | Refills: 0 | DISCHARGE

## 2019-06-12 RX ORDER — FENTANYL CITRATE 50 UG/ML
50 INJECTION INTRAVENOUS
Refills: 0 | Status: DISCONTINUED | OUTPATIENT
Start: 2019-06-12 | End: 2019-06-12

## 2019-06-12 RX ORDER — ASPIRIN/CALCIUM CARB/MAGNESIUM 324 MG
1 TABLET ORAL
Qty: 0 | Refills: 0 | DISCHARGE

## 2019-06-12 RX ORDER — OXYCODONE HYDROCHLORIDE 5 MG/1
5 TABLET ORAL EVERY 4 HOURS
Refills: 0 | Status: DISCONTINUED | OUTPATIENT
Start: 2019-06-12 | End: 2019-06-16

## 2019-06-12 RX ORDER — SODIUM CHLORIDE 9 MG/ML
1000 INJECTION, SOLUTION INTRAVENOUS
Refills: 0 | Status: DISCONTINUED | OUTPATIENT
Start: 2019-06-12 | End: 2019-06-12

## 2019-06-12 RX ORDER — METOPROLOL TARTRATE 50 MG
50 TABLET ORAL DAILY
Refills: 0 | Status: DISCONTINUED | OUTPATIENT
Start: 2019-06-12 | End: 2019-06-16

## 2019-06-12 RX ORDER — OXYCODONE HYDROCHLORIDE 5 MG/1
10 TABLET ORAL ONCE
Refills: 0 | Status: DISCONTINUED | OUTPATIENT
Start: 2019-06-12 | End: 2019-06-12

## 2019-06-12 RX ORDER — ACETAMINOPHEN 500 MG
650 TABLET ORAL EVERY 6 HOURS
Refills: 0 | Status: DISCONTINUED | OUTPATIENT
Start: 2019-06-12 | End: 2019-06-16

## 2019-06-12 RX ORDER — EZETIMIBE 10 MG/1
1 TABLET ORAL
Qty: 0 | Refills: 0 | DISCHARGE

## 2019-06-12 RX ORDER — ONDANSETRON 8 MG/1
4 TABLET, FILM COATED ORAL ONCE
Refills: 0 | Status: DISCONTINUED | OUTPATIENT
Start: 2019-06-12 | End: 2019-06-12

## 2019-06-12 RX ORDER — POLYETHYLENE GLYCOL 3350 17 G/17G
1 POWDER, FOR SOLUTION ORAL
Qty: 0 | Refills: 0 | DISCHARGE

## 2019-06-12 RX ORDER — ALVIMOPAN 12 MG/1
12 CAPSULE ORAL ONCE
Refills: 0 | Status: COMPLETED | OUTPATIENT
Start: 2019-06-12 | End: 2019-06-12

## 2019-06-12 RX ORDER — OXYCODONE HYDROCHLORIDE 5 MG/1
5 TABLET ORAL ONCE
Refills: 0 | Status: DISCONTINUED | OUTPATIENT
Start: 2019-06-12 | End: 2019-06-12

## 2019-06-12 RX ORDER — CELECOXIB 200 MG/1
200 CAPSULE ORAL ONCE
Refills: 0 | Status: COMPLETED | OUTPATIENT
Start: 2019-06-12 | End: 2019-06-12

## 2019-06-12 RX ORDER — GABAPENTIN 400 MG/1
600 CAPSULE ORAL ONCE
Refills: 0 | Status: COMPLETED | OUTPATIENT
Start: 2019-06-12 | End: 2019-06-12

## 2019-06-12 RX ORDER — OXYCODONE HYDROCHLORIDE 5 MG/1
10 TABLET ORAL EVERY 4 HOURS
Refills: 0 | Status: DISCONTINUED | OUTPATIENT
Start: 2019-06-12 | End: 2019-06-16

## 2019-06-12 RX ORDER — ALVIMOPAN 12 MG/1
12 CAPSULE ORAL
Refills: 0 | Status: DISCONTINUED | OUTPATIENT
Start: 2019-06-12 | End: 2019-06-16

## 2019-06-12 RX ORDER — CHOLECALCIFEROL (VITAMIN D3) 125 MCG
1 CAPSULE ORAL
Qty: 0 | Refills: 0 | DISCHARGE

## 2019-06-12 RX ORDER — HEPARIN SODIUM 5000 [USP'U]/ML
5000 INJECTION INTRAVENOUS; SUBCUTANEOUS EVERY 12 HOURS
Refills: 0 | Status: DISCONTINUED | OUTPATIENT
Start: 2019-06-13 | End: 2019-06-16

## 2019-06-12 RX ADMIN — Medication 975 MILLIGRAM(S): at 13:36

## 2019-06-12 RX ADMIN — FENTANYL CITRATE 50 MICROGRAM(S): 50 INJECTION INTRAVENOUS at 19:25

## 2019-06-12 RX ADMIN — ALVIMOPAN 12 MILLIGRAM(S): 12 CAPSULE ORAL at 14:01

## 2019-06-12 RX ADMIN — FAMOTIDINE 20 MILLIGRAM(S): 10 INJECTION INTRAVENOUS at 13:36

## 2019-06-12 RX ADMIN — HEPARIN SODIUM 5000 UNIT(S): 5000 INJECTION INTRAVENOUS; SUBCUTANEOUS at 13:35

## 2019-06-12 RX ADMIN — Medication 650 MILLIGRAM(S): at 23:36

## 2019-06-12 RX ADMIN — ATORVASTATIN CALCIUM 20 MILLIGRAM(S): 80 TABLET, FILM COATED ORAL at 23:17

## 2019-06-12 RX ADMIN — Medication 975 MILLIGRAM(S): at 13:38

## 2019-06-12 RX ADMIN — CELECOXIB 200 MILLIGRAM(S): 200 CAPSULE ORAL at 13:36

## 2019-06-12 RX ADMIN — FENTANYL CITRATE 50 MICROGRAM(S): 50 INJECTION INTRAVENOUS at 19:18

## 2019-06-12 RX ADMIN — SODIUM CHLORIDE 75 MILLILITER(S): 9 INJECTION, SOLUTION INTRAVENOUS at 19:18

## 2019-06-12 RX ADMIN — SODIUM CHLORIDE 100 MILLILITER(S): 9 INJECTION, SOLUTION INTRAVENOUS at 22:07

## 2019-06-12 RX ADMIN — OXYCODONE HYDROCHLORIDE 5 MILLIGRAM(S): 5 TABLET ORAL at 19:18

## 2019-06-12 NOTE — BRIEF OPERATIVE NOTE - NSICDXBRIEFPOSTOP_GEN_ALL_CORE_FT
POST-OP DIAGNOSIS:  Pelvic abscess in female 12-Jun-2019 18:40:51  Velasquez Eller  Sigmoid diverticulitis 12-Jun-2019 18:40:02  Velasquez Eller

## 2019-06-12 NOTE — BRIEF OPERATIVE NOTE - NSICDXBRIEFPREOP_GEN_ALL_CORE_FT
PRE-OP DIAGNOSIS:  Sigmoid diverticulitis 12-Jun-2019 18:38:40  Velasquez Eller  Colovesical fistula 12-Jun-2019 18:39:42  Velasquez Eller A

## 2019-06-12 NOTE — BRIEF OPERATIVE NOTE - NSICDXBRIEFPROCEDURE_GEN_ALL_CORE_FT
PROCEDURES:  Repair of colovesical fistula 12-Jun-2019 18:38:07  Velasquez Eller  Rigid proctosigmoidoscopy 12-Jun-2019 18:37:56  Velasquez Eller  Robot-assisted laparoscopic resection of sigmoid colon 12-Jun-2019 18:37:48  Velasquez Eller

## 2019-06-13 PROBLEM — I10 ESSENTIAL (PRIMARY) HYPERTENSION: Chronic | Status: ACTIVE | Noted: 2019-06-04

## 2019-06-13 PROBLEM — E78.5 HYPERLIPIDEMIA, UNSPECIFIED: Chronic | Status: ACTIVE | Noted: 2019-06-04

## 2019-06-13 PROBLEM — K57.90 DIVERTICULOSIS OF INTESTINE, PART UNSPECIFIED, WITHOUT PERFORATION OR ABSCESS WITHOUT BLEEDING: Chronic | Status: ACTIVE | Noted: 2019-06-04

## 2019-06-13 PROBLEM — R94.31 ABNORMAL ELECTROCARDIOGRAM [ECG] [EKG]: Chronic | Status: ACTIVE | Noted: 2019-06-04

## 2019-06-13 PROBLEM — K59.00 CONSTIPATION, UNSPECIFIED: Chronic | Status: ACTIVE | Noted: 2019-06-04

## 2019-06-13 LAB
ANION GAP SERPL CALC-SCNC: 11 MMOL/L — SIGNIFICANT CHANGE UP (ref 5–17)
BASOPHILS # BLD AUTO: 0.01 K/UL — SIGNIFICANT CHANGE UP (ref 0–0.2)
BASOPHILS NFR BLD AUTO: 0.1 % — SIGNIFICANT CHANGE UP (ref 0–2)
BUN SERPL-MCNC: 14 MG/DL — SIGNIFICANT CHANGE UP (ref 7–23)
CALCIUM SERPL-MCNC: 8.7 MG/DL — SIGNIFICANT CHANGE UP (ref 8.5–10.1)
CHLORIDE SERPL-SCNC: 99 MMOL/L — SIGNIFICANT CHANGE UP (ref 96–108)
CO2 SERPL-SCNC: 25 MMOL/L — SIGNIFICANT CHANGE UP (ref 22–31)
CREAT SERPL-MCNC: 0.87 MG/DL — SIGNIFICANT CHANGE UP (ref 0.5–1.3)
EOSINOPHIL # BLD AUTO: 0 K/UL — SIGNIFICANT CHANGE UP (ref 0–0.5)
EOSINOPHIL NFR BLD AUTO: 0 % — SIGNIFICANT CHANGE UP (ref 0–6)
GLUCOSE BLDC GLUCOMTR-MCNC: 109 MG/DL — HIGH (ref 70–99)
GLUCOSE BLDC GLUCOMTR-MCNC: 111 MG/DL — HIGH (ref 70–99)
GLUCOSE BLDC GLUCOMTR-MCNC: 120 MG/DL — HIGH (ref 70–99)
GLUCOSE SERPL-MCNC: 109 MG/DL — HIGH (ref 70–99)
HCT VFR BLD CALC: 35.1 % — SIGNIFICANT CHANGE UP (ref 34.5–45)
HGB BLD-MCNC: 11.8 G/DL — SIGNIFICANT CHANGE UP (ref 11.5–15.5)
IMM GRANULOCYTES NFR BLD AUTO: 0.3 % — SIGNIFICANT CHANGE UP (ref 0–1.5)
LYMPHOCYTES # BLD AUTO: 0.7 K/UL — LOW (ref 1–3.3)
LYMPHOCYTES # BLD AUTO: 7.8 % — LOW (ref 13–44)
MCHC RBC-ENTMCNC: 30.1 PG — SIGNIFICANT CHANGE UP (ref 27–34)
MCHC RBC-ENTMCNC: 33.6 GM/DL — SIGNIFICANT CHANGE UP (ref 32–36)
MCV RBC AUTO: 89.5 FL — SIGNIFICANT CHANGE UP (ref 80–100)
MONOCYTES # BLD AUTO: 0.45 K/UL — SIGNIFICANT CHANGE UP (ref 0–0.9)
MONOCYTES NFR BLD AUTO: 5 % — SIGNIFICANT CHANGE UP (ref 2–14)
NEUTROPHILS # BLD AUTO: 7.8 K/UL — HIGH (ref 1.8–7.4)
NEUTROPHILS NFR BLD AUTO: 86.8 % — HIGH (ref 43–77)
PLATELET # BLD AUTO: 249 K/UL — SIGNIFICANT CHANGE UP (ref 150–400)
POTASSIUM SERPL-MCNC: 3.7 MMOL/L — SIGNIFICANT CHANGE UP (ref 3.5–5.3)
POTASSIUM SERPL-SCNC: 3.7 MMOL/L — SIGNIFICANT CHANGE UP (ref 3.5–5.3)
RBC # BLD: 3.92 M/UL — SIGNIFICANT CHANGE UP (ref 3.8–5.2)
RBC # FLD: 13 % — SIGNIFICANT CHANGE UP (ref 10.3–14.5)
SODIUM SERPL-SCNC: 135 MMOL/L — SIGNIFICANT CHANGE UP (ref 135–145)
WBC # BLD: 8.99 K/UL — SIGNIFICANT CHANGE UP (ref 3.8–10.5)
WBC # FLD AUTO: 8.99 K/UL — SIGNIFICANT CHANGE UP (ref 3.8–10.5)

## 2019-06-13 RX ADMIN — Medication 400 MILLIGRAM(S): at 07:59

## 2019-06-13 RX ADMIN — Medication 100 GRAM(S): at 05:48

## 2019-06-13 RX ADMIN — Medication 400 MILLIGRAM(S): at 02:42

## 2019-06-13 RX ADMIN — HEPARIN SODIUM 5000 UNIT(S): 5000 INJECTION INTRAVENOUS; SUBCUTANEOUS at 05:49

## 2019-06-13 RX ADMIN — Medication 1000 MILLIGRAM(S): at 14:00

## 2019-06-13 RX ADMIN — Medication 50 MILLIGRAM(S): at 05:49

## 2019-06-13 RX ADMIN — Medication 650 MILLIGRAM(S): at 00:30

## 2019-06-13 RX ADMIN — SODIUM CHLORIDE 100 MILLILITER(S): 9 INJECTION, SOLUTION INTRAVENOUS at 05:52

## 2019-06-13 RX ADMIN — SODIUM CHLORIDE 100 MILLILITER(S): 9 INJECTION, SOLUTION INTRAVENOUS at 18:04

## 2019-06-13 RX ADMIN — Medication 400 MILLIGRAM(S): at 20:11

## 2019-06-13 RX ADMIN — Medication 1000 MILLIGRAM(S): at 03:05

## 2019-06-13 RX ADMIN — ATORVASTATIN CALCIUM 20 MILLIGRAM(S): 80 TABLET, FILM COATED ORAL at 22:58

## 2019-06-13 RX ADMIN — HEPARIN SODIUM 5000 UNIT(S): 5000 INJECTION INTRAVENOUS; SUBCUTANEOUS at 18:05

## 2019-06-13 RX ADMIN — Medication 400 MILLIGRAM(S): at 13:46

## 2019-06-13 RX ADMIN — ALVIMOPAN 12 MILLIGRAM(S): 12 CAPSULE ORAL at 18:05

## 2019-06-13 RX ADMIN — Medication 1000 MILLIGRAM(S): at 08:14

## 2019-06-13 RX ADMIN — ALVIMOPAN 12 MILLIGRAM(S): 12 CAPSULE ORAL at 05:48

## 2019-06-13 RX ADMIN — SODIUM CHLORIDE 3 MILLILITER(S): 9 INJECTION INTRAMUSCULAR; INTRAVENOUS; SUBCUTANEOUS at 12:53

## 2019-06-13 RX ADMIN — SODIUM CHLORIDE 3 MILLILITER(S): 9 INJECTION INTRAMUSCULAR; INTRAVENOUS; SUBCUTANEOUS at 07:11

## 2019-06-13 NOTE — PROGRESS NOTE ADULT - ASSESSMENT
84F s/p sigmoidectomy and takedown of colovaginal fistula  - advance diet  - keep nevarez until tomorrow  - oob  - IS

## 2019-06-13 NOTE — PROGRESS NOTE ADULT - SUBJECTIVE AND OBJECTIVE BOX
Denies pain, n/v. Denies bf.     Vital Signs Last 24 Hrs  T(C): 36.8 (13 Jun 2019 11:33), Max: 37.2 (12 Jun 2019 19:00)  T(F): 98.3 (13 Jun 2019 11:33), Max: 98.9 (12 Jun 2019 19:00)  HR: 67 (13 Jun 2019 11:33) (53 - 98)  BP: 116/49 (13 Jun 2019 11:33) (107/65 - 156/64)  BP(mean): --  RR: 17 (13 Jun 2019 11:33) (13 - 18)  SpO2: 96% (13 Jun 2019 11:33) (94% - 100%)    abd soft, no distended, vac C/d/i                          11.8   8.99  )-----------( 249      ( 13 Jun 2019 06:12 )             35.1   06-13    135  |  99  |  14  ----------------------------<  109<H>  3.7   |  25  |  0.87    Ca    8.7      13 Jun 2019 06:12

## 2019-06-14 LAB
-  AMPICILLIN: SIGNIFICANT CHANGE UP
-  TETRACYCLINE: SIGNIFICANT CHANGE UP
-  VANCOMYCIN: SIGNIFICANT CHANGE UP
ANION GAP SERPL CALC-SCNC: 6 MMOL/L — SIGNIFICANT CHANGE UP (ref 5–17)
BASOPHILS # BLD AUTO: 0.05 K/UL — SIGNIFICANT CHANGE UP (ref 0–0.2)
BASOPHILS NFR BLD AUTO: 0.7 % — SIGNIFICANT CHANGE UP (ref 0–2)
BUN SERPL-MCNC: 11 MG/DL — SIGNIFICANT CHANGE UP (ref 7–23)
CALCIUM SERPL-MCNC: 8.8 MG/DL — SIGNIFICANT CHANGE UP (ref 8.5–10.1)
CHLORIDE SERPL-SCNC: 106 MMOL/L — SIGNIFICANT CHANGE UP (ref 96–108)
CO2 SERPL-SCNC: 30 MMOL/L — SIGNIFICANT CHANGE UP (ref 22–31)
CREAT SERPL-MCNC: 0.75 MG/DL — SIGNIFICANT CHANGE UP (ref 0.5–1.3)
EOSINOPHIL # BLD AUTO: 0.1 K/UL — SIGNIFICANT CHANGE UP (ref 0–0.5)
EOSINOPHIL NFR BLD AUTO: 1.4 % — SIGNIFICANT CHANGE UP (ref 0–6)
GLUCOSE BLDC GLUCOMTR-MCNC: 112 MG/DL — HIGH (ref 70–99)
GLUCOSE SERPL-MCNC: 96 MG/DL — SIGNIFICANT CHANGE UP (ref 70–99)
HCT VFR BLD CALC: 34.1 % — LOW (ref 34.5–45)
HGB BLD-MCNC: 11.1 G/DL — LOW (ref 11.5–15.5)
IMM GRANULOCYTES NFR BLD AUTO: 0.6 % — SIGNIFICANT CHANGE UP (ref 0–1.5)
LYMPHOCYTES # BLD AUTO: 1.29 K/UL — SIGNIFICANT CHANGE UP (ref 1–3.3)
LYMPHOCYTES # BLD AUTO: 18.6 % — SIGNIFICANT CHANGE UP (ref 13–44)
MCHC RBC-ENTMCNC: 30 PG — SIGNIFICANT CHANGE UP (ref 27–34)
MCHC RBC-ENTMCNC: 32.6 GM/DL — SIGNIFICANT CHANGE UP (ref 32–36)
MCV RBC AUTO: 92.2 FL — SIGNIFICANT CHANGE UP (ref 80–100)
METHOD TYPE: SIGNIFICANT CHANGE UP
MONOCYTES # BLD AUTO: 0.53 K/UL — SIGNIFICANT CHANGE UP (ref 0–0.9)
MONOCYTES NFR BLD AUTO: 7.6 % — SIGNIFICANT CHANGE UP (ref 2–14)
NEUTROPHILS # BLD AUTO: 4.94 K/UL — SIGNIFICANT CHANGE UP (ref 1.8–7.4)
NEUTROPHILS NFR BLD AUTO: 71.1 % — SIGNIFICANT CHANGE UP (ref 43–77)
PLATELET # BLD AUTO: 229 K/UL — SIGNIFICANT CHANGE UP (ref 150–400)
POTASSIUM SERPL-MCNC: 3.8 MMOL/L — SIGNIFICANT CHANGE UP (ref 3.5–5.3)
POTASSIUM SERPL-SCNC: 3.8 MMOL/L — SIGNIFICANT CHANGE UP (ref 3.5–5.3)
RBC # BLD: 3.7 M/UL — LOW (ref 3.8–5.2)
RBC # FLD: 13.2 % — SIGNIFICANT CHANGE UP (ref 10.3–14.5)
SODIUM SERPL-SCNC: 142 MMOL/L — SIGNIFICANT CHANGE UP (ref 135–145)
WBC # BLD: 6.95 K/UL — SIGNIFICANT CHANGE UP (ref 3.8–10.5)
WBC # FLD AUTO: 6.95 K/UL — SIGNIFICANT CHANGE UP (ref 3.8–10.5)

## 2019-06-14 RX ORDER — DOCUSATE SODIUM 100 MG
100 CAPSULE ORAL
Refills: 0 | Status: DISCONTINUED | OUTPATIENT
Start: 2019-06-14 | End: 2019-06-16

## 2019-06-14 RX ORDER — POLYETHYLENE GLYCOL 3350 17 G/17G
17 POWDER, FOR SOLUTION ORAL DAILY
Refills: 0 | Status: DISCONTINUED | OUTPATIENT
Start: 2019-06-14 | End: 2019-06-15

## 2019-06-14 RX ADMIN — HEPARIN SODIUM 5000 UNIT(S): 5000 INJECTION INTRAVENOUS; SUBCUTANEOUS at 05:22

## 2019-06-14 RX ADMIN — SODIUM CHLORIDE 3 MILLILITER(S): 9 INJECTION INTRAMUSCULAR; INTRAVENOUS; SUBCUTANEOUS at 12:59

## 2019-06-14 RX ADMIN — SODIUM CHLORIDE 100 MILLILITER(S): 9 INJECTION, SOLUTION INTRAVENOUS at 05:20

## 2019-06-14 RX ADMIN — SODIUM CHLORIDE 3 MILLILITER(S): 9 INJECTION INTRAMUSCULAR; INTRAVENOUS; SUBCUTANEOUS at 22:18

## 2019-06-14 RX ADMIN — SODIUM CHLORIDE 3 MILLILITER(S): 9 INJECTION INTRAMUSCULAR; INTRAVENOUS; SUBCUTANEOUS at 12:58

## 2019-06-14 RX ADMIN — ATORVASTATIN CALCIUM 20 MILLIGRAM(S): 80 TABLET, FILM COATED ORAL at 20:59

## 2019-06-14 RX ADMIN — POLYETHYLENE GLYCOL 3350 17 GRAM(S): 17 POWDER, FOR SOLUTION ORAL at 12:58

## 2019-06-14 RX ADMIN — Medication 650 MILLIGRAM(S): at 08:27

## 2019-06-14 RX ADMIN — Medication 100 MILLIGRAM(S): at 18:17

## 2019-06-14 RX ADMIN — ALVIMOPAN 12 MILLIGRAM(S): 12 CAPSULE ORAL at 18:17

## 2019-06-14 RX ADMIN — HEPARIN SODIUM 5000 UNIT(S): 5000 INJECTION INTRAVENOUS; SUBCUTANEOUS at 18:17

## 2019-06-14 RX ADMIN — Medication 50 MILLIGRAM(S): at 05:21

## 2019-06-14 RX ADMIN — Medication 650 MILLIGRAM(S): at 08:59

## 2019-06-14 RX ADMIN — ALVIMOPAN 12 MILLIGRAM(S): 12 CAPSULE ORAL at 05:21

## 2019-06-14 NOTE — PROGRESS NOTE ADULT - SUBJECTIVE AND OBJECTIVE BOX
no n/v  no bowel function yet  sitting up in chair    Vital Signs Last 24 Hrs  T(C): 36.9 (14 Jun 2019 04:24), Max: 37.1 (13 Jun 2019 20:50)  T(F): 98.4 (14 Jun 2019 04:24), Max: 98.7 (13 Jun 2019 20:50)  HR: 64 (14 Jun 2019 04:24) (62 - 67)  BP: 115/52 (14 Jun 2019 04:24) (115/52 - 116/49)  BP(mean): --  RR: 18 (14 Jun 2019 04:24) (17 - 18)  SpO2: 96% (14 Jun 2019 04:24) (95% - 96%)    NAD  abd soft  prevena intact                          11.1   6.95  )-----------( 229      ( 14 Jun 2019 07:04 )             34.1   06-14    142  |  106  |  11  ----------------------------<  96  3.8   |  30  |  0.75    Ca    8.8      14 Jun 2019 07:04    Culture Results:   Numerous Enterococcus species (06.12.19 @ 15:51)

## 2019-06-14 NOTE — CONSULT NOTE ADULT - SUBJECTIVE AND OBJECTIVE BOX
History of Present Illness	  This is a 85 y/o female with a PMH of HTN and HLD who reports that for the last year she has had vaginal or urethral discharge and passing of gas through her urethra.  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   -s/p robotic sigmoidectomy and resection of fistula, pod#2    6.14: no distress, tolerated diet, however feels bloated, no flatus, no BM      PAST MEDICAL HISTORY: as above    PAST SURGICAL HISTORY:  Elective surgery Left meniscus repair 2015  H/O colonoscopy 2015 was negative  H/O cystoscopy 12/2018  History of cholecystectomy about 2013  S/P hysterectomy 1974.     FAMILY HISTORY:  FH: heart failure, mother who passed away at age 93  FH: lung cancer, father who passed away at age 76.    Social History: retired   · Lives alone  no smoking, mild daily Etoh use            REVIEW OF SYSTEMS:    CONSTITUTIONAL: No weakness, No fevers or chills  ENT: No ear ache, No sorethroat  NECK: No pain, No stiffness  RESPIRATORY: No cough, No wheezing, No hemoptysis; No dyspnea  CARDIOVASCULAR: No chest pain, No palpitations  GASTROINTESTINAL: No abd pain, No nausea, No vomiting, No hematemesis, No diarrhea or constipation. No melena, No hematochezia.  GENITOURINARY: No dysuria, No  hematuria  NEUROLOGICAL: No diplopia, No paresthesia, No motor dysfunction  MUSCULOSKELETAL: No arthralgia, No myalgia  SKIN: No rashes, or lesions   PSYCH: no anxiety, no suicidal ideation    All other review of systems is negative unless indicated above    Vital Signs Last 24 Hrs  T(C): 36.9 (14 Jun 2019 12:51), Max: 37.1 (13 Jun 2019 20:50)  T(F): 98.5 (14 Jun 2019 12:51), Max: 98.7 (13 Jun 2019 20:50)  HR: 64 (14 Jun 2019 12:51) (62 - 64)  BP: 104/78 (14 Jun 2019 12:51) (104/78 - 115/54)  BP(mean): --  RR: 17 (14 Jun 2019 12:51) (17 - 18)  SpO2: 96% (14 Jun 2019 12:51) (95% - 96%)    PHYSICAL EXAM:    GENERAL: NAD, Well nourished  HEENT:  NC/AT, EOMI, PERRLA, No scleral icterus, Moist mucous membranes  NECK: Supple, No JVD  CNS:  Alert & Oriented X3, Motor Strength 5/5 B/L upper and lower extremities; DTRs 2+ intact   LUNG: Normal Breath sounds, Clear to auscultation bilaterally, No rales, No rhonchi, No wheezing  HEART: RRR; No murmurs, No rubs  ABDOMEN: +BS, distended, +wound VAC in midlne  GENITOURINARY: Voiding, Bladder not distended  EXTREMITIES:  2+ Peripheral Pulses, No clubbing, No cyanosis, No tibial edema  MUSCULOSKELTAL: Joints normal ROM, No TTP, No effusion  VAGINAL: deferred  SKIN: no rashes  RECTAL: deferred, not indicated  BREAST: deferred                          11.1   6.95  )-----------( 229      ( 14 Jun 2019 07:04 )             34.1     06-14    142  |  106  |  11  ----------------------------<  96  3.8   |  30  |  0.75    Ca    8.8      14 Jun 2019 07:04      Vancomycin levels:   Cultures:     MEDICATIONS  (STANDING):  alvimopan 12 milliGRAM(s) Oral two times a day  atorvastatin 20 milliGRAM(s) Oral at bedtime  docusate sodium 100 milliGRAM(s) Oral two times a day  heparin  Injectable 5000 Unit(s) SubCutaneous every 12 hours  metoprolol succinate ER 50 milliGRAM(s) Oral daily  polyethylene glycol 3350 17 Gram(s) Oral daily    MEDICATIONS  (PRN):  acetaminophen   Tablet .. 650 milliGRAM(s) Oral every 6 hours PRN Temp greater or equal to 38C (100.4F), Mild Pain (1 - 3)  ondansetron Injectable 4 milliGRAM(s) IV Push every 6 hours PRN Nausea and/or Vomiting  oxyCODONE    IR 5 milliGRAM(s) Oral every 4 hours PRN Moderate Pain (4 - 6)  oxyCODONE    IR 10 milliGRAM(s) Oral every 4 hours PRN Severe Pain (7 - 10)      all labs reviewed  all imaging reviewed    a/p:    1. pod#2 s/p sigmoidectomy and fistula resection  advance diet per surgery  ambulate    2. Htn: stable  cw Lopressor    3. HLD:   cw statins

## 2019-06-14 NOTE — PROGRESS NOTE ADULT - ASSESSMENT
s/p sigmoid colon resection and repair of CVF  diverticulitis    advance diet  colace, miralax added  oob  heplock IVF  dc [place] today  anticipate dc home tomorrow

## 2019-06-15 LAB
ANION GAP SERPL CALC-SCNC: 7 MMOL/L — SIGNIFICANT CHANGE UP (ref 5–17)
BASOPHILS # BLD AUTO: 0.04 K/UL — SIGNIFICANT CHANGE UP (ref 0–0.2)
BASOPHILS NFR BLD AUTO: 0.6 % — SIGNIFICANT CHANGE UP (ref 0–2)
BUN SERPL-MCNC: 7 MG/DL — SIGNIFICANT CHANGE UP (ref 7–23)
CALCIUM SERPL-MCNC: 8.8 MG/DL — SIGNIFICANT CHANGE UP (ref 8.5–10.1)
CHLORIDE SERPL-SCNC: 105 MMOL/L — SIGNIFICANT CHANGE UP (ref 96–108)
CO2 SERPL-SCNC: 30 MMOL/L — SIGNIFICANT CHANGE UP (ref 22–31)
CREAT SERPL-MCNC: 0.72 MG/DL — SIGNIFICANT CHANGE UP (ref 0.5–1.3)
EOSINOPHIL # BLD AUTO: 0.24 K/UL — SIGNIFICANT CHANGE UP (ref 0–0.5)
EOSINOPHIL NFR BLD AUTO: 3.5 % — SIGNIFICANT CHANGE UP (ref 0–6)
GLUCOSE SERPL-MCNC: 98 MG/DL — SIGNIFICANT CHANGE UP (ref 70–99)
HCT VFR BLD CALC: 34.2 % — LOW (ref 34.5–45)
HGB BLD-MCNC: 11.2 G/DL — LOW (ref 11.5–15.5)
IMM GRANULOCYTES NFR BLD AUTO: 0.3 % — SIGNIFICANT CHANGE UP (ref 0–1.5)
LYMPHOCYTES # BLD AUTO: 1.72 K/UL — SIGNIFICANT CHANGE UP (ref 1–3.3)
LYMPHOCYTES # BLD AUTO: 25.1 % — SIGNIFICANT CHANGE UP (ref 13–44)
MCHC RBC-ENTMCNC: 29.9 PG — SIGNIFICANT CHANGE UP (ref 27–34)
MCHC RBC-ENTMCNC: 32.7 GM/DL — SIGNIFICANT CHANGE UP (ref 32–36)
MCV RBC AUTO: 91.2 FL — SIGNIFICANT CHANGE UP (ref 80–100)
MONOCYTES # BLD AUTO: 0.63 K/UL — SIGNIFICANT CHANGE UP (ref 0–0.9)
MONOCYTES NFR BLD AUTO: 9.2 % — SIGNIFICANT CHANGE UP (ref 2–14)
NEUTROPHILS # BLD AUTO: 4.2 K/UL — SIGNIFICANT CHANGE UP (ref 1.8–7.4)
NEUTROPHILS NFR BLD AUTO: 61.3 % — SIGNIFICANT CHANGE UP (ref 43–77)
PLATELET # BLD AUTO: 245 K/UL — SIGNIFICANT CHANGE UP (ref 150–400)
POTASSIUM SERPL-MCNC: 3.3 MMOL/L — LOW (ref 3.5–5.3)
POTASSIUM SERPL-SCNC: 3.3 MMOL/L — LOW (ref 3.5–5.3)
RBC # BLD: 3.75 M/UL — LOW (ref 3.8–5.2)
RBC # FLD: 13.4 % — SIGNIFICANT CHANGE UP (ref 10.3–14.5)
SODIUM SERPL-SCNC: 142 MMOL/L — SIGNIFICANT CHANGE UP (ref 135–145)
WBC # BLD: 6.85 K/UL — SIGNIFICANT CHANGE UP (ref 3.8–10.5)
WBC # FLD AUTO: 6.85 K/UL — SIGNIFICANT CHANGE UP (ref 3.8–10.5)

## 2019-06-15 RX ORDER — POTASSIUM CHLORIDE 20 MEQ
40 PACKET (EA) ORAL EVERY 4 HOURS
Refills: 0 | Status: COMPLETED | OUTPATIENT
Start: 2019-06-15 | End: 2019-06-15

## 2019-06-15 RX ORDER — LANOLIN ALCOHOL/MO/W.PET/CERES
3 CREAM (GRAM) TOPICAL AT BEDTIME
Refills: 0 | Status: DISCONTINUED | OUTPATIENT
Start: 2019-06-15 | End: 2019-06-16

## 2019-06-15 RX ORDER — POLYETHYLENE GLYCOL 3350 17 G/17G
17 POWDER, FOR SOLUTION ORAL
Refills: 0 | Status: DISCONTINUED | OUTPATIENT
Start: 2019-06-15 | End: 2019-06-15

## 2019-06-15 RX ORDER — POLYETHYLENE GLYCOL 3350 17 G/17G
17 POWDER, FOR SOLUTION ORAL
Refills: 0 | Status: DISCONTINUED | OUTPATIENT
Start: 2019-06-15 | End: 2019-06-16

## 2019-06-15 RX ORDER — HYDROCHLOROTHIAZIDE 25 MG
12.5 TABLET ORAL DAILY
Refills: 0 | Status: DISCONTINUED | OUTPATIENT
Start: 2019-06-15 | End: 2019-06-16

## 2019-06-15 RX ADMIN — HEPARIN SODIUM 5000 UNIT(S): 5000 INJECTION INTRAVENOUS; SUBCUTANEOUS at 05:11

## 2019-06-15 RX ADMIN — Medication 3 MILLIGRAM(S): at 21:49

## 2019-06-15 RX ADMIN — Medication 40 MILLIEQUIVALENT(S): at 11:55

## 2019-06-15 RX ADMIN — SODIUM CHLORIDE 3 MILLILITER(S): 9 INJECTION INTRAMUSCULAR; INTRAVENOUS; SUBCUTANEOUS at 21:49

## 2019-06-15 RX ADMIN — Medication 12.5 MILLIGRAM(S): at 11:56

## 2019-06-15 RX ADMIN — Medication 40 MILLIEQUIVALENT(S): at 15:10

## 2019-06-15 RX ADMIN — ALVIMOPAN 12 MILLIGRAM(S): 12 CAPSULE ORAL at 05:10

## 2019-06-15 RX ADMIN — Medication 50 MILLIGRAM(S): at 05:10

## 2019-06-15 RX ADMIN — Medication 100 MILLIGRAM(S): at 17:33

## 2019-06-15 RX ADMIN — Medication 100 MILLIGRAM(S): at 05:10

## 2019-06-15 RX ADMIN — ATORVASTATIN CALCIUM 20 MILLIGRAM(S): 80 TABLET, FILM COATED ORAL at 21:49

## 2019-06-15 RX ADMIN — ALVIMOPAN 12 MILLIGRAM(S): 12 CAPSULE ORAL at 17:33

## 2019-06-15 RX ADMIN — SODIUM CHLORIDE 3 MILLILITER(S): 9 INJECTION INTRAMUSCULAR; INTRAVENOUS; SUBCUTANEOUS at 14:42

## 2019-06-15 RX ADMIN — SODIUM CHLORIDE 3 MILLILITER(S): 9 INJECTION INTRAMUSCULAR; INTRAVENOUS; SUBCUTANEOUS at 07:15

## 2019-06-15 RX ADMIN — SODIUM CHLORIDE 3 MILLILITER(S): 9 INJECTION INTRAMUSCULAR; INTRAVENOUS; SUBCUTANEOUS at 14:43

## 2019-06-15 RX ADMIN — POLYETHYLENE GLYCOL 3350 17 GRAM(S): 17 POWDER, FOR SOLUTION ORAL at 12:01

## 2019-06-15 RX ADMIN — HEPARIN SODIUM 5000 UNIT(S): 5000 INJECTION INTRAVENOUS; SUBCUTANEOUS at 17:33

## 2019-06-15 NOTE — PROGRESS NOTE ADULT - ASSESSMENT
POD 3    Cont current diet.  Replete K.  Adjust home anti HTN meds.  Increase miralax.  D/C planning for tomorrow.

## 2019-06-15 NOTE — PROGRESS NOTE ADULT - SUBJECTIVE AND OBJECTIVE BOX
History of Present Illness	  This is a 85 y/o female with a PMH of HTN and HLD who reports that for the last year she has had vaginal or urethral discharge and passing of gas through her urethra.  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   -s/p robotic sigmoidectomy and resection of fistula, pod#2    6.14: no distress, tolerated diet, however feels bloated, no flatus, no BM  6.15: passed flatus, no BM          REVIEW OF SYSTEMS:    CONSTITUTIONAL: No weakness, No fevers or chills  ENT: No ear ache, No sorethroat  NECK: No pain, No stiffness  RESPIRATORY: No cough, No wheezing, No hemoptysis; No dyspnea  CARDIOVASCULAR: No chest pain, No palpitations  GASTROINTESTINAL: No abd pain, No nausea, No vomiting, No hematemesis, No diarrhea or constipation. No melena, No hematochezia.  GENITOURINARY: No dysuria, No  hematuria  NEUROLOGICAL: No diplopia, No paresthesia, No motor dysfunction  MUSCULOSKELETAL: No arthralgia, No myalgia  SKIN: No rashes, or lesions   PSYCH: no anxiety, no suicidal ideation    All other review of systems is negative unless indicated above    Vital Signs Last 24 Hrs  T(C): 36.9 (15 Galileo 2019 11:49), Max: 37.2 (15 Galileo 2019 04:16)  T(F): 98.5 (15 Galileo 2019 11:49), Max: 98.9 (15 Galileo 2019 04:16)  HR: 68 (15 Galileo 2019 11:49) (68 - 79)  BP: 123/49 (15 Galileo 2019 11:49) (123/49 - 154/57)  RR: 17 (15 Galileo 2019 11:49) (17 - 18)  SpO2: 96% (15 Galileo 2019 11:49) (95% - 96%)    PHYSICAL EXAM:    GENERAL: NAD, Well nourished  HEENT:  NC/AT, EOMI, PERRLA, No scleral icterus, Moist mucous membranes  NECK: Supple, No JVD  CNS:  Alert & Oriented X3, Motor Strength 5/5 B/L upper and lower extremities; DTRs 2+ intact   LUNG: Normal Breath sounds, Clear to auscultation bilaterally, No rales, No rhonchi, No wheezing  HEART: RRR; No murmurs, No rubs  ABDOMEN: +BS, distended, +wound VAC in midlne  GENITOURINARY: Voiding, Bladder not distended  EXTREMITIES:  2+ Peripheral Pulses, No clubbing, No cyanosis, No tibial edema  MUSCULOSKELTAL: Joints normal ROM, No TTP, No effusion  VAGINAL: deferred  SKIN: no rashes  RECTAL: deferred, not indicated  BREAST: deferred                          11.1   6.95  )-----------( 229      ( 14 Jun 2019 07:04 )             34.1     06-14    142  |  106  |  11  ----------------------------<  96  3.8   |  30  |  0.75    Ca    8.8      14 Jun 2019 07:04      Vancomycin levels:   Cultures:     MEDICATIONS  (STANDING):  alvimopan 12 milliGRAM(s) Oral two times a day  atorvastatin 20 milliGRAM(s) Oral at bedtime  docusate sodium 100 milliGRAM(s) Oral two times a day  heparin  Injectable 5000 Unit(s) SubCutaneous every 12 hours  metoprolol succinate ER 50 milliGRAM(s) Oral daily  polyethylene glycol 3350 17 Gram(s) Oral daily    MEDICATIONS  (PRN):  acetaminophen   Tablet .. 650 milliGRAM(s) Oral every 6 hours PRN Temp greater or equal to 38C (100.4F), Mild Pain (1 - 3)  ondansetron Injectable 4 milliGRAM(s) IV Push every 6 hours PRN Nausea and/or Vomiting  oxyCODONE    IR 5 milliGRAM(s) Oral every 4 hours PRN Moderate Pain (4 - 6)  oxyCODONE    IR 10 milliGRAM(s) Oral every 4 hours PRN Severe Pain (7 - 10)      all labs reviewed  all imaging reviewed    a/p:    1. pod#3 s/p sigmoidectomy and fistula resection  advance diet per surgery  ambulate    h/o constipation: Miralax Bid    2. Htn: stable  cw Lopressor    3. HLD:   cw statins

## 2019-06-15 NOTE — PROGRESS NOTE ADULT - SUBJECTIVE AND OBJECTIVE BOX
Not eating much though denies N/V. Passing flatus, no stools.    MEDICATIONS  (STANDING):  alvimopan 12 milliGRAM(s) Oral two times a day  atorvastatin 20 milliGRAM(s) Oral at bedtime  docusate sodium 100 milliGRAM(s) Oral two times a day  heparin  Injectable 5000 Unit(s) SubCutaneous every 12 hours  hydrochlorothiazide 12.5 milliGRAM(s) Oral daily  melatonin 3 milliGRAM(s) Oral at bedtime  metoprolol succinate ER 50 milliGRAM(s) Oral daily  polyethylene glycol 3350 17 Gram(s) Oral two times a day  potassium chloride    Tablet ER 40 milliEquivalent(s) Oral every 4 hours  sodium chloride 0.9% lock flush 3 milliLiter(s) IV Push every 8 hours  sodium chloride 0.9% lock flush 3 milliLiter(s) IV Push every 8 hours    MEDICATIONS  (PRN):  acetaminophen   Tablet .. 650 milliGRAM(s) Oral every 6 hours PRN Temp greater or equal to 38C (100.4F), Mild Pain (1 - 3)  ondansetron Injectable 4 milliGRAM(s) IV Push every 6 hours PRN Nausea and/or Vomiting  oxyCODONE    IR 5 milliGRAM(s) Oral every 4 hours PRN Moderate Pain (4 - 6)  oxyCODONE    IR 10 milliGRAM(s) Oral every 4 hours PRN Severe Pain (7 - 10)    Vital Signs Last 24 Hrs  T(C): 37.2 (15 Galileo 2019 04:16), Max: 37.2 (15 Galileo 2019 04:16)  T(F): 98.9 (15 Galileo 2019 04:16), Max: 98.9 (15 Galileo 2019 04:16)  HR: 72 (15 Galileo 2019 04:16) (64 - 79)  BP: 154/57 (15 Galileo 2019 04:16) (104/78 - 154/57)  BP(mean): --  RR: 18 (15 Galileo 2019 04:16) (17 - 18)  SpO2: 95% (15 Galileo 2019 04:16) (95% - 96%)  I&O's Detail    14 Jun 2019 07:01  -  15 Galileo 2019 07:00  --------------------------------------------------------  IN:  Total IN: 0 mL    OUT:    Indwelling Catheter - Urethral: 300 mL    Voided: 2250 mL  Total OUT: 2550 mL    Total NET: -2550 mL    NAD  ABD exam : incision CDI, soft, approp tender, mod distention                        11.2   6.85  )-----------( 245      ( 15 Galileo 2019 06:19 )             34.2     06-15    142  |  105  |  7   ----------------------------<  98  3.3<L>   |  30  |  0.72    Ca    8.8      15 Galileo 2019 06:19

## 2019-06-16 ENCOUNTER — TRANSCRIPTION ENCOUNTER (OUTPATIENT)
Age: 84
End: 2019-06-16

## 2019-06-16 VITALS
HEART RATE: 65 BPM | DIASTOLIC BLOOD PRESSURE: 59 MMHG | SYSTOLIC BLOOD PRESSURE: 140 MMHG | TEMPERATURE: 98 F | RESPIRATION RATE: 18 BRPM | OXYGEN SATURATION: 97 %

## 2019-06-16 LAB
ANION GAP SERPL CALC-SCNC: 5 MMOL/L — SIGNIFICANT CHANGE UP (ref 5–17)
BASOPHILS # BLD AUTO: 0.04 K/UL — SIGNIFICANT CHANGE UP (ref 0–0.2)
BASOPHILS NFR BLD AUTO: 0.7 % — SIGNIFICANT CHANGE UP (ref 0–2)
BUN SERPL-MCNC: 6 MG/DL — LOW (ref 7–23)
CALCIUM SERPL-MCNC: 9 MG/DL — SIGNIFICANT CHANGE UP (ref 8.5–10.1)
CHLORIDE SERPL-SCNC: 105 MMOL/L — SIGNIFICANT CHANGE UP (ref 96–108)
CO2 SERPL-SCNC: 30 MMOL/L — SIGNIFICANT CHANGE UP (ref 22–31)
CREAT SERPL-MCNC: 0.65 MG/DL — SIGNIFICANT CHANGE UP (ref 0.5–1.3)
EOSINOPHIL # BLD AUTO: 0.41 K/UL — SIGNIFICANT CHANGE UP (ref 0–0.5)
EOSINOPHIL NFR BLD AUTO: 7.2 % — HIGH (ref 0–6)
GLUCOSE SERPL-MCNC: 106 MG/DL — HIGH (ref 70–99)
HCT VFR BLD CALC: 34 % — LOW (ref 34.5–45)
HGB BLD-MCNC: 11 G/DL — LOW (ref 11.5–15.5)
IMM GRANULOCYTES NFR BLD AUTO: 0.2 % — SIGNIFICANT CHANGE UP (ref 0–1.5)
LYMPHOCYTES # BLD AUTO: 1.25 K/UL — SIGNIFICANT CHANGE UP (ref 1–3.3)
LYMPHOCYTES # BLD AUTO: 22 % — SIGNIFICANT CHANGE UP (ref 13–44)
MCHC RBC-ENTMCNC: 29.7 PG — SIGNIFICANT CHANGE UP (ref 27–34)
MCHC RBC-ENTMCNC: 32.4 GM/DL — SIGNIFICANT CHANGE UP (ref 32–36)
MCV RBC AUTO: 91.9 FL — SIGNIFICANT CHANGE UP (ref 80–100)
MONOCYTES # BLD AUTO: 0.52 K/UL — SIGNIFICANT CHANGE UP (ref 0–0.9)
MONOCYTES NFR BLD AUTO: 9.2 % — SIGNIFICANT CHANGE UP (ref 2–14)
NEUTROPHILS # BLD AUTO: 3.45 K/UL — SIGNIFICANT CHANGE UP (ref 1.8–7.4)
NEUTROPHILS NFR BLD AUTO: 60.7 % — SIGNIFICANT CHANGE UP (ref 43–77)
PLATELET # BLD AUTO: 241 K/UL — SIGNIFICANT CHANGE UP (ref 150–400)
POTASSIUM SERPL-MCNC: 3.7 MMOL/L — SIGNIFICANT CHANGE UP (ref 3.5–5.3)
POTASSIUM SERPL-SCNC: 3.7 MMOL/L — SIGNIFICANT CHANGE UP (ref 3.5–5.3)
RBC # BLD: 3.7 M/UL — LOW (ref 3.8–5.2)
RBC # FLD: 13.4 % — SIGNIFICANT CHANGE UP (ref 10.3–14.5)
SODIUM SERPL-SCNC: 140 MMOL/L — SIGNIFICANT CHANGE UP (ref 135–145)
WBC # BLD: 5.68 K/UL — SIGNIFICANT CHANGE UP (ref 3.8–10.5)
WBC # FLD AUTO: 5.68 K/UL — SIGNIFICANT CHANGE UP (ref 3.8–10.5)

## 2019-06-16 RX ADMIN — HEPARIN SODIUM 5000 UNIT(S): 5000 INJECTION INTRAVENOUS; SUBCUTANEOUS at 05:43

## 2019-06-16 RX ADMIN — POLYETHYLENE GLYCOL 3350 17 GRAM(S): 17 POWDER, FOR SOLUTION ORAL at 09:09

## 2019-06-16 RX ADMIN — Medication 100 MILLIGRAM(S): at 05:43

## 2019-06-16 RX ADMIN — SODIUM CHLORIDE 3 MILLILITER(S): 9 INJECTION INTRAMUSCULAR; INTRAVENOUS; SUBCUTANEOUS at 05:43

## 2019-06-16 RX ADMIN — Medication 50 MILLIGRAM(S): at 05:43

## 2019-06-16 RX ADMIN — Medication 12.5 MILLIGRAM(S): at 05:43

## 2019-06-16 RX ADMIN — ALVIMOPAN 12 MILLIGRAM(S): 12 CAPSULE ORAL at 05:42

## 2019-06-16 NOTE — DISCHARGE NOTE PROVIDER - CARE PROVIDER_API CALL
Demetrius Knight)  ColonRectal Surgery; Surgery  [address] [place] [address]  Phone: [phone]  Fax: [phone]  Follow Up Time: 1 week

## 2019-06-16 NOTE — DISCHARGE NOTE PROVIDER - NSDCCPTREATMENT_GEN_ALL_CORE_FT
PRINCIPAL PROCEDURE  Procedure: Resection, colon, left or sigmoid, laparoscopic  Findings and Treatment:

## 2019-06-16 NOTE — DISCHARGE NOTE NURSING/CASE MANAGEMENT/SOCIAL WORK - NSDCDPATPORTLINK_GEN_ALL_CORE
You can access the [facility], offered by [facility], by registering with the following website: [email or web address]

## 2019-06-16 NOTE — PROGRESS NOTE ADULT - SUBJECTIVE AND OBJECTIVE BOX
History of Present Illness	  This is a 85 y/o female with a PMH of HTN and HLD who reports that for the last year she has had vaginal or urethral discharge and passing of gas through her urethra.  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   -s/p robotic sigmoidectomy and resection of fistula, pod#2    6.14: no distress, tolerated diet, however feels bloated, no flatus, no BM  6.15: passed flatus, no BM  6.16: had BM, no distress          REVIEW OF SYSTEMS:    CONSTITUTIONAL: No weakness, No fevers or chills  ENT: No ear ache, No sorethroat  NECK: No pain, No stiffness  RESPIRATORY: No cough, No wheezing, No hemoptysis; No dyspnea  CARDIOVASCULAR: No chest pain, No palpitations  GASTROINTESTINAL: No abd pain, No nausea, No vomiting, No hematemesis, No diarrhea or constipation. No melena, No hematochezia.  GENITOURINARY: No dysuria, No  hematuria  NEUROLOGICAL: No diplopia, No paresthesia, No motor dysfunction  MUSCULOSKELETAL: No arthralgia, No myalgia  SKIN: No rashes, or lesions   PSYCH: no anxiety, no suicidal ideation    All other review of systems is negative unless indicated above    Vital Signs Last 24 Hrs  T(C): 36.9 (15 Galileo 2019 11:49), Max: 37.2 (15 Galileo 2019 04:16)  T(F): 98.5 (15 Galileo 2019 11:49), Max: 98.9 (15 Galileo 2019 04:16)  HR: 68 (15 Galileo 2019 11:49) (68 - 79)  BP: 123/49 (15 Galileo 2019 11:49) (123/49 - 154/57)  RR: 17 (15 Galileo 2019 11:49) (17 - 18)  SpO2: 96% (15 Galileo 2019 11:49) (95% - 96%)    PHYSICAL EXAM:    GENERAL: NAD, Well nourished  HEENT:  NC/AT, EOMI, PERRLA, No scleral icterus, Moist mucous membranes  NECK: Supple, No JVD  CNS:  Alert & Oriented X3, Motor Strength 5/5 B/L upper and lower extremities; DTRs 2+ intact   LUNG: Normal Breath sounds, Clear to auscultation bilaterally, No rales, No rhonchi, No wheezing  HEART: RRR; No murmurs, No rubs  ABDOMEN: +BS, distended, +wound VAC in midlne  GENITOURINARY: Voiding, Bladder not distended  EXTREMITIES:  2+ Peripheral Pulses, No clubbing, No cyanosis, No tibial edema  MUSCULOSKELTAL: Joints normal ROM, No TTP, No effusion  VAGINAL: deferred  SKIN: no rashes  RECTAL: deferred, not indicated  BREAST: deferred                          11.1   6.95  )-----------( 229      ( 14 Jun 2019 07:04 )             34.1     06-14    142  |  106  |  11  ----------------------------<  96  3.8   |  30  |  0.75    Ca    8.8      14 Jun 2019 07:04      Vancomycin levels:   Cultures:     MEDICATIONS  (STANDING):  alvimopan 12 milliGRAM(s) Oral two times a day  atorvastatin 20 milliGRAM(s) Oral at bedtime  docusate sodium 100 milliGRAM(s) Oral two times a day  heparin  Injectable 5000 Unit(s) SubCutaneous every 12 hours  metoprolol succinate ER 50 milliGRAM(s) Oral daily  polyethylene glycol 3350 17 Gram(s) Oral daily    MEDICATIONS  (PRN):  acetaminophen   Tablet .. 650 milliGRAM(s) Oral every 6 hours PRN Temp greater or equal to 38C (100.4F), Mild Pain (1 - 3)  ondansetron Injectable 4 milliGRAM(s) IV Push every 6 hours PRN Nausea and/or Vomiting  oxyCODONE    IR 5 milliGRAM(s) Oral every 4 hours PRN Moderate Pain (4 - 6)  oxyCODONE    IR 10 milliGRAM(s) Oral every 4 hours PRN Severe Pain (7 - 10)      all labs reviewed  all imaging reviewed    a/p:    1. pod#4 s/p sigmoidectomy and fistula resection  advance diet per surgery  ambulate    h/o constipation: Miralax Bid    2. Htn: stable  cw Lopressor    3. HLD:   cw statins

## 2019-06-16 NOTE — DISCHARGE NOTE PROVIDER - HOSPITAL COURSE
84F admitted for laparoscopic sigmoid resection. She had an uneventful postop course with gradual advancement of diet and gradual return of bowel function. By POD 4, she was eating, passing gas and stool and her pain was controlled. Her labwork and vitals were WNL. As such, she was deemed stable for discharge.

## 2019-06-17 LAB
CULTURE RESULTS: SIGNIFICANT CHANGE UP
ORGANISM # SPEC MICROSCOPIC CNT: SIGNIFICANT CHANGE UP
ORGANISM # SPEC MICROSCOPIC CNT: SIGNIFICANT CHANGE UP
SPECIMEN SOURCE: SIGNIFICANT CHANGE UP
SURGICAL PATHOLOGY STUDY: SIGNIFICANT CHANGE UP

## 2019-06-19 ENCOUNTER — OTHER (OUTPATIENT)
Age: 84
End: 2019-06-19

## 2019-06-19 DIAGNOSIS — N82.3 FISTULA OF VAGINA TO LARGE INTESTINE: ICD-10-CM

## 2019-06-19 DIAGNOSIS — I10 ESSENTIAL (PRIMARY) HYPERTENSION: ICD-10-CM

## 2019-06-19 DIAGNOSIS — N73.6 FEMALE PELVIC PERITONEAL ADHESIONS (POSTINFECTIVE): ICD-10-CM

## 2019-06-19 DIAGNOSIS — K57.20 DIVERTICULITIS OF LARGE INTESTINE WITH PERFORATION AND ABSCESS WITHOUT BLEEDING: ICD-10-CM

## 2019-06-19 DIAGNOSIS — E78.5 HYPERLIPIDEMIA, UNSPECIFIED: ICD-10-CM

## 2019-06-19 DIAGNOSIS — N32.1 VESICOINTESTINAL FISTULA: ICD-10-CM

## 2019-06-19 RX ORDER — NEOMYCIN SULFATE 500 MG/1
500 TABLET ORAL
Qty: 6 | Refills: 0 | Status: DISCONTINUED | COMMUNITY
Start: 2019-05-14 | End: 2019-06-19

## 2019-06-19 RX ORDER — METRONIDAZOLE 500 MG/1
500 TABLET ORAL
Qty: 3 | Refills: 0 | Status: DISCONTINUED | COMMUNITY
Start: 2019-05-14 | End: 2019-06-19

## 2019-06-19 RX ORDER — AMOXICILLIN AND CLAVULANATE POTASSIUM 875; 125 MG/1; MG/1
875-125 TABLET, COATED ORAL TWICE DAILY
Qty: 42 | Refills: 0 | Status: DISCONTINUED | COMMUNITY
Start: 2019-05-14 | End: 2019-06-19

## 2019-06-21 ENCOUNTER — APPOINTMENT (OUTPATIENT)
Dept: COLORECTAL SURGERY | Facility: CLINIC | Age: 84
End: 2019-06-21
Payer: MEDICARE

## 2019-06-21 VITALS
BODY MASS INDEX: 23.78 KG/M2 | HEART RATE: 83 BPM | WEIGHT: 148 LBS | SYSTOLIC BLOOD PRESSURE: 127 MMHG | RESPIRATION RATE: 14 BRPM | DIASTOLIC BLOOD PRESSURE: 79 MMHG | HEIGHT: 66 IN

## 2019-06-21 DIAGNOSIS — Z09 ENCOUNTER FOR FOLLOW-UP EXAMINATION AFTER COMPLETED TREATMENT FOR CONDITIONS OTHER THAN MALIGNANT NEOPLASM: ICD-10-CM

## 2019-06-21 PROCEDURE — 99024 POSTOP FOLLOW-UP VISIT: CPT

## 2019-06-21 NOTE — HISTORY OF PRESENT ILLNESS
[FreeTextEntry1] : Ms. Lea presents to the office for a POV. Overall, doing well. No c/o pain. Javad po without difficulties but does not have much appetite. Passes stool several times daily without incident.

## 2019-06-21 NOTE — ASSESSMENT
[FreeTextEntry1] : Here for POV.\par Doing well.\par Staples removed.\par Counselled on ongoing wound care.\par Liberalize diet to high fiber.\par OK to drive short distances. \par Avoid heavy lifting or straining for another 1 month.\par OK to golf in ~ 2 weeks.

## 2019-07-17 ENCOUNTER — APPOINTMENT (OUTPATIENT)
Dept: ORTHOPEDIC SURGERY | Facility: CLINIC | Age: 84
End: 2019-07-17
Payer: MEDICARE

## 2019-07-17 VITALS
HEART RATE: 73 BPM | TEMPERATURE: 98.1 F | HEIGHT: 66 IN | SYSTOLIC BLOOD PRESSURE: 130 MMHG | WEIGHT: 148 LBS | DIASTOLIC BLOOD PRESSURE: 65 MMHG | BODY MASS INDEX: 23.78 KG/M2

## 2019-07-17 DIAGNOSIS — M19.039 PRIMARY OSTEOARTHRITIS, UNSPECIFIED WRIST: ICD-10-CM

## 2019-07-17 PROCEDURE — 99204 OFFICE O/P NEW MOD 45 MIN: CPT

## 2019-07-17 NOTE — CONSULT LETTER
[Dear  ___] : Dear  [unfilled], [Consult Letter:] : I had the pleasure of evaluating your patient, [unfilled]. [Please see my note below.] : Please see my note below. [Consult Closing:] : Thank you very much for allowing me to participate in the care of this patient.  If you have any questions, please do not hesitate to contact me. [Sincerely,] : Sincerely, [FreeTextEntry3] : Dr. Roro Carson\par Orthopaedic Surgery\par Hand & Upper Extremity.\par

## 2019-07-17 NOTE — HISTORY OF PRESENT ILLNESS
[FreeTextEntry1] : the patient is an 84-year-old female who presents for followup of left wrist pain. She had a mechanical fall on July 5 after which she experienced pain at the radial aspect of the wrist and base of the thumb. Her symptoms initially resolved however they returned after she began activity with the left hand and wrist. Currently she is wearing a wrist immobilizer which provides relief.She describes the pain as dull in nature it is worse with activity and improved with rest. She was seen at an urgent care at the time of her injury, x-rays were negative for fracture.

## 2019-07-17 NOTE — REVIEW OF SYSTEMS
[Joint Pain] : joint pain [Sleep Disturbances] : ~T sleep disturbances [Negative] : Allergic/Immunologic

## 2019-07-17 NOTE — ASSESSMENT
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 Her exam and imaging are negative for fracture, I recommend activity as tolerated and use of a comfort cool as needed.

## 2019-08-13 ENCOUNTER — RX RENEWAL (OUTPATIENT)
Age: 84
End: 2019-08-13

## 2019-08-19 ENCOUNTER — RX RENEWAL (OUTPATIENT)
Age: 84
End: 2019-08-19

## 2019-08-30 ENCOUNTER — RX RENEWAL (OUTPATIENT)
Age: 84
End: 2019-08-30

## 2019-09-03 ENCOUNTER — RX RENEWAL (OUTPATIENT)
Age: 84
End: 2019-09-03

## 2019-09-07 ENCOUNTER — TRANSCRIPTION ENCOUNTER (OUTPATIENT)
Age: 84
End: 2019-09-07

## 2019-09-13 ENCOUNTER — APPOINTMENT (OUTPATIENT)
Dept: FAMILY MEDICINE | Facility: CLINIC | Age: 84
End: 2019-09-13
Payer: MEDICARE

## 2019-09-13 VITALS
WEIGHT: 151.5 LBS | SYSTOLIC BLOOD PRESSURE: 130 MMHG | BODY MASS INDEX: 24.35 KG/M2 | DIASTOLIC BLOOD PRESSURE: 70 MMHG | RESPIRATION RATE: 16 BRPM | HEIGHT: 66 IN | OXYGEN SATURATION: 98 % | HEART RATE: 72 BPM

## 2019-09-13 PROCEDURE — 90662 IIV NO PRSV INCREASED AG IM: CPT

## 2019-09-13 PROCEDURE — G0008: CPT

## 2019-09-13 PROCEDURE — 99214 OFFICE O/P EST MOD 30 MIN: CPT | Mod: 25

## 2019-09-13 RX ORDER — SACCHAROMYCES BOULARDII 250 MG
250 CAPSULE ORAL TWICE DAILY
Qty: 42 | Refills: 0 | Status: DISCONTINUED | COMMUNITY
Start: 2019-05-14 | End: 2019-09-13

## 2019-09-13 NOTE — ASSESSMENT
[FreeTextEntry1] : CAD - refill metoprolol, hctz\par HLD - refill ezetimibe & statin, check flp, hepatic function\par elev hba1c- recheck hba1c\par vit D def - check level\par Risks and benefits of high dose flu vaccine discussed w/ pt. Consent given by pt, high dose flu vaccine administered. Pt tolerated well\par

## 2019-09-13 NOTE — HISTORY OF PRESENT ILLNESS
[de-identified] : Pt for f/u CAD, HLD, DM, vit D def. Pt not following carb controlled diet, reports she eats excess carbs and ice cream nightly. Pt not supplementing vit D at this time, due for recheck of labs and refill of meds. Denies CP, palpitations, dyspnea, n/v.

## 2019-09-21 LAB
25(OH)D3 SERPL-MCNC: 36.9 NG/ML
ALBUMIN SERPL ELPH-MCNC: 4.3 G/DL
ALP BLD-CCNC: 100 U/L
ALT SERPL-CCNC: 28 U/L
ANION GAP SERPL CALC-SCNC: 11 MMOL/L
AST SERPL-CCNC: 19 U/L
BILIRUB SERPL-MCNC: 0.4 MG/DL
BUN SERPL-MCNC: 13 MG/DL
CALCIUM SERPL-MCNC: 9.6 MG/DL
CHLORIDE SERPL-SCNC: 101 MMOL/L
CHOLEST SERPL-MCNC: 141 MG/DL
CHOLEST/HDLC SERPL: 2.3 RATIO
CO2 SERPL-SCNC: 29 MMOL/L
CREAT SERPL-MCNC: 0.82 MG/DL
ESTIMATED AVERAGE GLUCOSE: 131 MG/DL
GLUCOSE SERPL-MCNC: 121 MG/DL
HBA1C MFR BLD HPLC: 6.2 %
HDLC SERPL-MCNC: 62 MG/DL
LDLC SERPL CALC-MCNC: 56 MG/DL
POTASSIUM SERPL-SCNC: 4.8 MMOL/L
PROT SERPL-MCNC: 6.9 G/DL
SODIUM SERPL-SCNC: 141 MMOL/L
TRIGL SERPL-MCNC: 113 MG/DL

## 2019-12-10 ENCOUNTER — APPOINTMENT (OUTPATIENT)
Dept: FAMILY MEDICINE | Facility: CLINIC | Age: 84
End: 2019-12-10
Payer: MEDICARE

## 2019-12-10 VITALS
WEIGHT: 159 LBS | HEART RATE: 95 BPM | RESPIRATION RATE: 16 BRPM | SYSTOLIC BLOOD PRESSURE: 134 MMHG | DIASTOLIC BLOOD PRESSURE: 70 MMHG | BODY MASS INDEX: 25.55 KG/M2 | HEIGHT: 66 IN | OXYGEN SATURATION: 96 %

## 2019-12-10 DIAGNOSIS — I25.10 ATHEROSCLEROTIC HEART DISEASE OF NATIVE CORONARY ARTERY W/OUT ANGINA PECTORIS: ICD-10-CM

## 2019-12-10 PROCEDURE — G0439: CPT

## 2019-12-10 NOTE — PHYSICAL EXAM
[No Acute Distress] : no acute distress [Well-Appearing] : well-appearing [Normal Sclera/Conjunctiva] : normal sclera/conjunctiva [PERRL] : pupils equal round and reactive to light [Normal Outer Ear/Nose] : the outer ears and nose were normal in appearance [Normal Oropharynx] : the oropharynx was normal [No Lymphadenopathy] : no lymphadenopathy [No Respiratory Distress] : no respiratory distress  [No Accessory Muscle Use] : no accessory muscle use [Supple] : supple [Normal Rate] : normal rate  [Clear to Auscultation] : lungs were clear to auscultation bilaterally [Regular Rhythm] : with a regular rhythm [Soft] : abdomen soft [Non Tender] : non-tender [Non-distended] : non-distended [Normal Bowel Sounds] : normal bowel sounds [No Focal Deficits] : no focal deficits [Normal Affect] : the affect was normal [Normal Insight/Judgement] : insight and judgment were intact

## 2019-12-10 NOTE — HEALTH RISK ASSESSMENT
[Excellent] : ~his/her~ current health as excellent [Very Good] : ~his/her~  mood as very good [Yes] : Yes [4 or more  times a week (4 pts)] : 4 or more  times a week (4 points) [1 or 2 (0 pts)] : 1 or 2 (0 points) [Never (0 pts)] : Never (0 points) [Alone] : lives alone [Retired] : retired [Graduate School] : graduate school [Feels Safe at Home] : Feels safe at home [Fully functional (bathing, dressing, toileting, transferring, walking, feeding)] : Fully functional (bathing, dressing, toileting, transferring, walking, feeding) [Fully functional (using the telephone, shopping, preparing meals, housekeeping, doing laundry, using] : Fully functional and needs no help or supervision to perform IADLs (using the telephone, shopping, preparing meals, housekeeping, doing laundry, using transportation, managing medications and managing finances) [Reports normal functional visual acuity (ie: able to read med bottle)] : Reports normal functional visual acuity [Smoke Detector] : smoke detector [Carbon Monoxide Detector] : carbon monoxide detector [Seat Belt] :  uses seat belt [Sunscreen] : uses sunscreen [Reviewed no changes] : Reviewed no changes [] : No [de-identified] : 1 drink a day [de-identified] : cardiologist (Dr. Welch), GI (Dr. Coronado), GYN (Dr. Pierre), optho (Dr. Duron) [de-identified] : golf, walks [Reports changes in hearing] : Reports no changes in hearing [de-identified] : eats a balanced diet with protein and veggies  [Reports changes in vision] : Reports no changes in vision [Reports changes in dental health] : Reports no changes in dental health [de-identified] : wears glasses and hearing aids [AdvancecareDate] : 12/10/19

## 2019-12-10 NOTE — PLAN
[FreeTextEntry1] : PreDM- f/u a1c.  Cont lifestyle modifications\par HTN- well controlled, cont meds\par HLD- well controlled, cont meds\par pt will f/u in 3-6 months for labs

## 2019-12-10 NOTE — REVIEW OF SYSTEMS
[Fever] : no fever [Chills] : no chills [Fatigue] : no fatigue [Discharge] : no discharge [Vision Problems] : no vision problems [Earache] : no earache [Nasal Discharge] : no nasal discharge [Sore Throat] : no sore throat [Chest Pain] : no chest pain [Palpitations] : no palpitations [Shortness Of Breath] : no shortness of breath [Wheezing] : no wheezing [Cough] : no cough [Nausea] : no nausea [Abdominal Pain] : no abdominal pain [Constipation] : constipation [Vomiting] : no vomiting [Dysuria] : no dysuria [Hematuria] : no hematuria [Headache] : no headache [Dizziness] : no dizziness [Anxiety] : no anxiety [Depression] : no depression

## 2019-12-10 NOTE — HISTORY OF PRESENT ILLNESS
[FreeTextEntry1] : MERLIN [de-identified] : 84 year old female with pmhx of HLD, preDM, HTN, s/p partial colectomy in June for fistula presents for AWV. She feels well today with no concerns.

## 2020-07-13 ENCOUNTER — APPOINTMENT (OUTPATIENT)
Dept: FAMILY MEDICINE | Facility: CLINIC | Age: 85
End: 2020-07-13
Payer: MEDICARE

## 2020-07-13 VITALS
HEIGHT: 66 IN | WEIGHT: 160 LBS | SYSTOLIC BLOOD PRESSURE: 128 MMHG | DIASTOLIC BLOOD PRESSURE: 72 MMHG | BODY MASS INDEX: 25.71 KG/M2 | OXYGEN SATURATION: 98 % | HEART RATE: 95 BPM

## 2020-07-13 DIAGNOSIS — D17.1 BENIGN LIPOMATOUS NEOPLASM OF SKIN AND SUBCUTANEOUS TISSUE OF TRUNK: ICD-10-CM

## 2020-07-13 DIAGNOSIS — M25.511 PAIN IN RIGHT SHOULDER: ICD-10-CM

## 2020-07-13 DIAGNOSIS — L98.9 DISORDER OF THE SKIN AND SUBCUTANEOUS TISSUE, UNSPECIFIED: ICD-10-CM

## 2020-07-13 PROCEDURE — 99214 OFFICE O/P EST MOD 30 MIN: CPT

## 2020-07-13 NOTE — HISTORY OF PRESENT ILLNESS
[FreeTextEntry1] : followup for labs, shoulder pain [de-identified] : 85 year old female presents for f/u and blood work. Overall feels well. She does complain of R shoulder pain. had seen an MD and gotten xrays which were normal. Her ROM has returned but she is still having some shoulder discomfort. \par She is also concerned for mass on left side of torso that is tender to touch. No rash\par She would like a derm referral for her multiple moles and skin CA screening.

## 2020-07-13 NOTE — PLAN
[FreeTextEntry1] : HLD\par - f/u lipid panel\par - renew meds\par \par R shoulder pain\par - PT referral\par \par Skin CA screening\par - derm referral \par \par Mass of torso\par - f/u US, likely lipoma and not of concern\par \par HCM\par - recommend tdap and shingrix. Will return for vaccines\par - PNA UTD\par f/u in 6 months or PRN

## 2020-07-14 LAB
25(OH)D3 SERPL-MCNC: 38.4 NG/ML
ALBUMIN SERPL ELPH-MCNC: 4.5 G/DL
ALP BLD-CCNC: 97 U/L
ALT SERPL-CCNC: 39 U/L
ANION GAP SERPL CALC-SCNC: 16 MMOL/L
AST SERPL-CCNC: 28 U/L
BASOPHILS # BLD AUTO: 0.06 K/UL
BASOPHILS NFR BLD AUTO: 0.9 %
BILIRUB SERPL-MCNC: 0.5 MG/DL
BUN SERPL-MCNC: 12 MG/DL
CALCIUM SERPL-MCNC: 9.6 MG/DL
CHLORIDE SERPL-SCNC: 99 MMOL/L
CHOLEST SERPL-MCNC: 144 MG/DL
CHOLEST/HDLC SERPL: 2.2 RATIO
CO2 SERPL-SCNC: 27 MMOL/L
CREAT SERPL-MCNC: 0.85 MG/DL
EOSINOPHIL # BLD AUTO: 0.17 K/UL
EOSINOPHIL NFR BLD AUTO: 2.4 %
ESTIMATED AVERAGE GLUCOSE: 137 MG/DL
GLUCOSE SERPL-MCNC: 126 MG/DL
HBA1C MFR BLD HPLC: 6.4 %
HCT VFR BLD CALC: 43.3 %
HDLC SERPL-MCNC: 66 MG/DL
HGB BLD-MCNC: 13.6 G/DL
IMM GRANULOCYTES NFR BLD AUTO: 0.1 %
LDLC SERPL CALC-MCNC: 58 MG/DL
LYMPHOCYTES # BLD AUTO: 1.82 K/UL
LYMPHOCYTES NFR BLD AUTO: 26.1 %
MAN DIFF?: NORMAL
MCHC RBC-ENTMCNC: 29.9 PG
MCHC RBC-ENTMCNC: 31.4 GM/DL
MCV RBC AUTO: 95.2 FL
MONOCYTES # BLD AUTO: 0.48 K/UL
MONOCYTES NFR BLD AUTO: 6.9 %
NEUTROPHILS # BLD AUTO: 4.43 K/UL
NEUTROPHILS NFR BLD AUTO: 63.6 %
PLATELET # BLD AUTO: 255 K/UL
POTASSIUM SERPL-SCNC: 4.6 MMOL/L
PROT SERPL-MCNC: 7 G/DL
RBC # BLD: 4.55 M/UL
RBC # FLD: 13.2 %
SODIUM SERPL-SCNC: 141 MMOL/L
TRIGL SERPL-MCNC: 101 MG/DL
TSH SERPL-ACNC: 2.61 UIU/ML
WBC # FLD AUTO: 6.97 K/UL

## 2020-07-16 ENCOUNTER — APPOINTMENT (OUTPATIENT)
Dept: FAMILY MEDICINE | Facility: CLINIC | Age: 85
End: 2020-07-16
Payer: MEDICARE

## 2020-07-16 VITALS — TEMPERATURE: 98 F

## 2020-07-16 DIAGNOSIS — Z23 ENCOUNTER FOR IMMUNIZATION: ICD-10-CM

## 2020-07-16 PROCEDURE — 90471 IMMUNIZATION ADMIN: CPT

## 2020-07-16 PROCEDURE — 90715 TDAP VACCINE 7 YRS/> IM: CPT | Mod: GY

## 2020-07-23 ENCOUNTER — APPOINTMENT (OUTPATIENT)
Dept: ULTRASOUND IMAGING | Facility: CLINIC | Age: 85
End: 2020-07-23
Payer: MEDICARE

## 2020-07-23 ENCOUNTER — OUTPATIENT (OUTPATIENT)
Dept: OUTPATIENT SERVICES | Facility: HOSPITAL | Age: 85
LOS: 1 days | End: 2020-07-23
Payer: MEDICARE

## 2020-07-23 DIAGNOSIS — Z98.890 OTHER SPECIFIED POSTPROCEDURAL STATES: Chronic | ICD-10-CM

## 2020-07-23 DIAGNOSIS — Z90.710 ACQUIRED ABSENCE OF BOTH CERVIX AND UTERUS: Chronic | ICD-10-CM

## 2020-07-23 DIAGNOSIS — Z90.49 ACQUIRED ABSENCE OF OTHER SPECIFIED PARTS OF DIGESTIVE TRACT: Chronic | ICD-10-CM

## 2020-07-23 DIAGNOSIS — D17.1 BENIGN LIPOMATOUS NEOPLASM OF SKIN AND SUBCUTANEOUS TISSUE OF TRUNK: ICD-10-CM

## 2020-07-23 DIAGNOSIS — Z41.9 ENCOUNTER FOR PROCEDURE FOR PURPOSES OTHER THAN REMEDYING HEALTH STATE, UNSPECIFIED: Chronic | ICD-10-CM

## 2020-07-23 PROCEDURE — 76604 US EXAM CHEST: CPT

## 2020-07-23 PROCEDURE — 76604 US EXAM CHEST: CPT | Mod: 26

## 2020-08-31 ENCOUNTER — APPOINTMENT (OUTPATIENT)
Dept: DERMATOLOGY | Facility: CLINIC | Age: 85
End: 2020-08-31
Payer: MEDICARE

## 2020-08-31 DIAGNOSIS — D18.01 HEMANGIOMA OF SKIN AND SUBCUTANEOUS TISSUE: ICD-10-CM

## 2020-08-31 DIAGNOSIS — Z85.828 PERSONAL HISTORY OF OTHER MALIGNANT NEOPLASM OF SKIN: ICD-10-CM

## 2020-08-31 DIAGNOSIS — L30.4 ERYTHEMA INTERTRIGO: ICD-10-CM

## 2020-08-31 DIAGNOSIS — Z77.22 CONTACT WITH AND (SUSPECTED) EXPOSURE TO ENVIRONMENTAL TOBACCO SMOKE (ACUTE) (CHRONIC): ICD-10-CM

## 2020-08-31 DIAGNOSIS — L82.1 OTHER SEBORRHEIC KERATOSIS: ICD-10-CM

## 2020-08-31 PROCEDURE — 99203 OFFICE O/P NEW LOW 30 MIN: CPT

## 2020-08-31 RX ORDER — AMOXICILLIN 500 MG/1
500 CAPSULE ORAL
Qty: 21 | Refills: 0 | Status: DISCONTINUED | COMMUNITY
Start: 2020-05-28

## 2020-08-31 RX ORDER — CHLORHEXIDINE GLUCONATE, 0.12% ORAL RINSE 1.2 MG/ML
0.12 SOLUTION DENTAL
Qty: 946 | Refills: 0 | Status: ACTIVE | COMMUNITY
Start: 2020-05-28

## 2020-08-31 RX ORDER — HYDROCORTISONE 25 MG/G
2.5 CREAM TOPICAL
Qty: 1 | Refills: 3 | Status: ACTIVE | COMMUNITY
Start: 2020-08-31 | End: 1900-01-01

## 2020-08-31 NOTE — CONSULT LETTER
[Dear  ___] : Dear  [unfilled], [Consult Letter:] : I had the pleasure of evaluating your patient, [unfilled]. [Consult Closing:] : Thank you very much for allowing me to participate in the care of this patient.  If you have any questions, please do not hesitate to contact me. [Sincerely,] : Sincerely, [FreeTextEntry2] : Heather Alcala, DO [FreeTextEntry1] : Examination of her skin reveals multiple seborrheic keratoses on the scalp and trunk, and no suspicious lesions\par She also has an intertrigo under the breasts and then treating with 2-1/2% hydrocortisone cream b.i.d. p.r.n.\par \par Please see attached chart note for further details. [FreeTextEntry3] : Todd García MD\par [address], [address]\par [place] [address]\par Tel ([phone])\par Fax ([phone])\par Private line ([phone])\par

## 2020-08-31 NOTE — ASSESSMENT
[FreeTextEntry1] : Multiple seborrheic keratoses on scalp and trunk\par Intertrigo under her breasts

## 2020-08-31 NOTE — PHYSICAL EXAM
[Alert] : alert [Oriented x 3] : ~L oriented x 3 [Well Nourished] : well nourished [FreeTextEntry3] : The following areas were examined and no significant abnormalities were seen except as noted below:\par \par Type II skin\par \par scalp, face, eyelids, nose, lips, ears, neck, chest, abdomen, back, buttocks, right arm, left arm, right hand, left hand,\par right  leg, left leg, right foot, left foot\par Breast and groin exams offered and declined by patient.\par \par Scalp: Mild to moderate large brown verrucous plaques\par Right medial supraclavicular area: No evidence recurrence of the previously treated basal cell carcinoma\par Trunk:  Multiple smaller black verrucous plaques, especially concentrated in the mid chest and under the breasts\par Moderate 2-5 mm bright erythematous smooth papules\par Inframammary area: Symmetric mild pinkness, with a more elevated erythematous scaly border present on the left side only - KOH-negative\par \par No suspicious lesions seen

## 2020-08-31 NOTE — HISTORY OF PRESENT ILLNESS
Bilirubin higher than yesterday.  Given this, will arrange for phototherapy. Repeat testing tomorrow at [facility].    Will route to triage to arrange for biliblanket.     LM on  for return call.    Ebenezer Akbar MD         [FreeTextEntry1] : Evaluation of growths [de-identified] : First visit for 85-year-old white female referred by Heather Alcala D.O., for evaluation of growths. Particularly concerned about lesions on his scalp. Patient has history of basal cell carcinoma treated in the distant past.\par Patient also complains of a rash under the breasts which gets worse in hot weather

## 2020-09-03 ENCOUNTER — APPOINTMENT (OUTPATIENT)
Dept: FAMILY MEDICINE | Facility: CLINIC | Age: 85
End: 2020-09-03
Payer: MEDICARE

## 2020-09-03 VITALS
OXYGEN SATURATION: 98 % | HEIGHT: 66 IN | TEMPERATURE: 96.9 F | RESPIRATION RATE: 16 BRPM | WEIGHT: 156 LBS | HEART RATE: 91 BPM | SYSTOLIC BLOOD PRESSURE: 118 MMHG | DIASTOLIC BLOOD PRESSURE: 80 MMHG | BODY MASS INDEX: 25.07 KG/M2

## 2020-09-03 DIAGNOSIS — R10.9 UNSPECIFIED ABDOMINAL PAIN: ICD-10-CM

## 2020-09-03 PROCEDURE — 99214 OFFICE O/P EST MOD 30 MIN: CPT

## 2020-09-03 NOTE — PHYSICAL EXAM
[Flat] : flat [Normal] : normal rate, regular rhythm, normal S1 and S2 and no murmur heard [No HSM] : no hepatosplenomegaly noted [None] : no CVA tenderness [de-identified] : Slight epigastric and LUQ tenderness.  [de-identified] : No tenderness of ribs or intercostal  [de-identified] : No rash under breasts.

## 2020-09-03 NOTE — HISTORY OF PRESENT ILLNESS
[FreeTextEntry8] : Pain under both breast for past few months. Now wakes her up. Lasts about an hour. Pain across entire lower anterior chest.No change in activity.\par  No nausea or GERD. Relief when she took ibuprofen.\par Saw cardiology and is to have monitor sent to her home. \par Saw dermatology for rash under her breasts which has cleared\par Night sweats persist since menopause.

## 2020-09-09 ENCOUNTER — APPOINTMENT (OUTPATIENT)
Dept: ULTRASOUND IMAGING | Facility: CLINIC | Age: 85
End: 2020-09-09
Payer: MEDICARE

## 2020-09-09 ENCOUNTER — OUTPATIENT (OUTPATIENT)
Dept: OUTPATIENT SERVICES | Facility: HOSPITAL | Age: 85
LOS: 1 days | End: 2020-09-09
Payer: MEDICARE

## 2020-09-09 DIAGNOSIS — Z98.890 OTHER SPECIFIED POSTPROCEDURAL STATES: Chronic | ICD-10-CM

## 2020-09-09 DIAGNOSIS — Z90.49 ACQUIRED ABSENCE OF OTHER SPECIFIED PARTS OF DIGESTIVE TRACT: Chronic | ICD-10-CM

## 2020-09-09 DIAGNOSIS — Z90.710 ACQUIRED ABSENCE OF BOTH CERVIX AND UTERUS: Chronic | ICD-10-CM

## 2020-09-09 DIAGNOSIS — Z41.9 ENCOUNTER FOR PROCEDURE FOR PURPOSES OTHER THAN REMEDYING HEALTH STATE, UNSPECIFIED: Chronic | ICD-10-CM

## 2020-09-09 DIAGNOSIS — R10.9 UNSPECIFIED ABDOMINAL PAIN: ICD-10-CM

## 2020-09-09 PROCEDURE — 76700 US EXAM ABDOM COMPLETE: CPT | Mod: 26

## 2020-09-09 PROCEDURE — 76700 US EXAM ABDOM COMPLETE: CPT

## 2020-09-21 ENCOUNTER — TRANSCRIPTION ENCOUNTER (OUTPATIENT)
Age: 85
End: 2020-09-21

## 2020-11-20 ENCOUNTER — NON-APPOINTMENT (OUTPATIENT)
Age: 85
End: 2020-11-20

## 2020-12-11 ENCOUNTER — APPOINTMENT (OUTPATIENT)
Dept: FAMILY MEDICINE | Facility: CLINIC | Age: 85
End: 2020-12-11
Payer: MEDICARE

## 2020-12-11 VITALS
OXYGEN SATURATION: 97 % | HEIGHT: 66 IN | TEMPERATURE: 95.9 F | BODY MASS INDEX: 25.07 KG/M2 | HEART RATE: 99 BPM | DIASTOLIC BLOOD PRESSURE: 70 MMHG | SYSTOLIC BLOOD PRESSURE: 130 MMHG | RESPIRATION RATE: 16 BRPM | WEIGHT: 156 LBS

## 2020-12-11 DIAGNOSIS — K21.9 GASTRO-ESOPHAGEAL REFLUX DISEASE W/OUT ESOPHAGITIS: ICD-10-CM

## 2020-12-11 PROCEDURE — G0444 DEPRESSION SCREEN ANNUAL: CPT | Mod: 59

## 2020-12-11 PROCEDURE — G0439: CPT

## 2020-12-11 PROCEDURE — 99213 OFFICE O/P EST LOW 20 MIN: CPT | Mod: 25

## 2020-12-11 NOTE — PHYSICAL EXAM
[No Acute Distress] : no acute distress [Well-Appearing] : well-appearing [Normal Sclera/Conjunctiva] : normal sclera/conjunctiva [PERRL] : pupils equal round and reactive to light [Normal Outer Ear/Nose] : the outer ears and nose were normal in appearance [Normal TMs] : both tympanic membranes were normal [No Respiratory Distress] : no respiratory distress  [No Accessory Muscle Use] : no accessory muscle use [Clear to Auscultation] : lungs were clear to auscultation bilaterally [Normal Rate] : normal rate  [Regular Rhythm] : with a regular rhythm [Soft] : abdomen soft [Non Tender] : non-tender [Non-distended] : non-distended [Normal Bowel Sounds] : normal bowel sounds [No Rash] : no rash [No Focal Deficits] : no focal deficits [Normal Affect] : the affect was normal [Normal Insight/Judgement] : insight and judgment were intact [de-identified] : + multiple lipomas noted, mildly tender to palpation

## 2020-12-11 NOTE — HISTORY OF PRESENT ILLNESS
[FreeTextEntry1] : MERLIN [de-identified] : 85 year old female with pmhx of HTN, HLD, preDM presents for AWV. She is feeling well overall. Does complain of burning sensation in chest at night that is improved with antacid medication. She has seen her cardiologist and workup was unremarkable. She also complains of multiple masses on her abdomen. Had an US previously that she states was normal. She was told they were lipomas.

## 2020-12-11 NOTE — HEALTH RISK ASSESSMENT
[Excellent] : ~his/her~ current health as excellent [Very Good] : ~his/her~  mood as very good [Yes] : Yes [4 or more  times a week (4 pts)] : 4 or more  times a week (4 points) [1 or 2 (0 pts)] : 1 or 2 (0 points) [Never (0 pts)] : Never (0 points) [One fall no injury in past year] : Patient reported one fall in the past year without injury [0] : 2) Feeling down, depressed, or hopeless: Not at all (0) [None] : None [Alone] : lives alone [Retired] : retired [Graduate School] : graduate school [Feels Safe at Home] : Feels safe at home [Fully functional (bathing, dressing, toileting, transferring, walking, feeding)] : Fully functional (bathing, dressing, toileting, transferring, walking, feeding) [Fully functional (using the telephone, shopping, preparing meals, housekeeping, doing laundry, using] : Fully functional and needs no help or supervision to perform IADLs (using the telephone, shopping, preparing meals, housekeeping, doing laundry, using transportation, managing medications and managing finances) [Reports changes in dental health] : Reports changes in dental health [Smoke Detector] : smoke detector [Seat Belt] :  uses seat belt [With Patient/Caregiver] : With Patient/Caregiver [] : No [de-identified] : cardiology , derm [Audit-CScore] : 4 [de-identified] : walks 1-2 miles a day  [de-identified] : balanced diet  [[id number]] : 0 [Reports changes in hearing] : Reports no changes in hearing [Reports changes in vision] : Reports no changes in vision [Reports normal functional visual acuity (ie: able to read med bottle)] : Reports poor functional visual acuity.  [MammogramDate] : 01/19 [ColonoscopyDate] : 01/19 [de-identified] : follows up with dental [AdvancecareDate] : 12/11/20

## 2020-12-11 NOTE — PLAN
[FreeTextEntry1] : HTN\par - controlled\par - followed by cardio\par \par GERD\par - trial of omeprazole\par - cardiac workup normal per pt\par \par HLD\par - f/u lipid panel\par - cont current meds \par \par Lipomas\par - US unremarkable\par - if become painful, advised to f/u

## 2020-12-14 LAB
25(OH)D3 SERPL-MCNC: 42.1 NG/ML
ALBUMIN SERPL ELPH-MCNC: 4.5 G/DL
ALP BLD-CCNC: 87 U/L
ALT SERPL-CCNC: 29 U/L
ANION GAP SERPL CALC-SCNC: 12 MMOL/L
AST SERPL-CCNC: 22 U/L
BASOPHILS # BLD AUTO: 0.05 K/UL
BASOPHILS NFR BLD AUTO: 0.8 %
BILIRUB SERPL-MCNC: 0.6 MG/DL
BUN SERPL-MCNC: 16 MG/DL
CALCIUM SERPL-MCNC: 10.1 MG/DL
CHLORIDE SERPL-SCNC: 100 MMOL/L
CHOLEST SERPL-MCNC: 150 MG/DL
CO2 SERPL-SCNC: 28 MMOL/L
CREAT SERPL-MCNC: 1.03 MG/DL
EOSINOPHIL # BLD AUTO: 0.11 K/UL
EOSINOPHIL NFR BLD AUTO: 1.7 %
ESTIMATED AVERAGE GLUCOSE: 134 MG/DL
GLUCOSE SERPL-MCNC: 121 MG/DL
HBA1C MFR BLD HPLC: 6.3 %
HCT VFR BLD CALC: 42.6 %
HDLC SERPL-MCNC: 72 MG/DL
HGB BLD-MCNC: 13.3 G/DL
IMM GRANULOCYTES NFR BLD AUTO: 0.3 %
LDLC SERPL CALC-MCNC: 54 MG/DL
LYMPHOCYTES # BLD AUTO: 1.78 K/UL
LYMPHOCYTES NFR BLD AUTO: 28.2 %
MAN DIFF?: NORMAL
MCHC RBC-ENTMCNC: 29.4 PG
MCHC RBC-ENTMCNC: 31.2 GM/DL
MCV RBC AUTO: 94 FL
MONOCYTES # BLD AUTO: 0.55 K/UL
MONOCYTES NFR BLD AUTO: 8.7 %
NEUTROPHILS # BLD AUTO: 3.8 K/UL
NEUTROPHILS NFR BLD AUTO: 60.3 %
NONHDLC SERPL-MCNC: 78 MG/DL
PLATELET # BLD AUTO: 263 K/UL
POTASSIUM SERPL-SCNC: 4.5 MMOL/L
PROT SERPL-MCNC: 6.7 G/DL
RBC # BLD: 4.53 M/UL
RBC # FLD: 13 %
SODIUM SERPL-SCNC: 140 MMOL/L
TRIGL SERPL-MCNC: 116 MG/DL
TSH SERPL-ACNC: 2.68 UIU/ML
WBC # FLD AUTO: 6.31 K/UL

## 2020-12-18 ENCOUNTER — RX RENEWAL (OUTPATIENT)
Age: 85
End: 2020-12-18

## 2021-01-04 ENCOUNTER — RX RENEWAL (OUTPATIENT)
Age: 86
End: 2021-01-04

## 2021-02-11 ENCOUNTER — APPOINTMENT (OUTPATIENT)
Dept: FAMILY MEDICINE | Facility: CLINIC | Age: 86
End: 2021-02-11

## 2021-03-26 ENCOUNTER — RX RENEWAL (OUTPATIENT)
Age: 86
End: 2021-03-26

## 2021-05-03 ENCOUNTER — APPOINTMENT (OUTPATIENT)
Dept: FAMILY MEDICINE | Facility: CLINIC | Age: 86
End: 2021-05-03
Payer: MEDICARE

## 2021-05-03 VITALS
TEMPERATURE: 96 F | BODY MASS INDEX: 25.23 KG/M2 | HEIGHT: 66 IN | WEIGHT: 157 LBS | SYSTOLIC BLOOD PRESSURE: 128 MMHG | DIASTOLIC BLOOD PRESSURE: 78 MMHG | HEART RATE: 80 BPM | OXYGEN SATURATION: 97 %

## 2021-05-03 DIAGNOSIS — Z12.31 ENCOUNTER FOR SCREENING MAMMOGRAM FOR MALIGNANT NEOPLASM OF BREAST: ICD-10-CM

## 2021-05-03 PROCEDURE — 99214 OFFICE O/P EST MOD 30 MIN: CPT | Mod: 25

## 2021-05-03 PROCEDURE — 96127 BRIEF EMOTIONAL/BEHAV ASSMT: CPT

## 2021-05-03 NOTE — HISTORY OF PRESENT ILLNESS
[FreeTextEntry1] : f/u  [de-identified] : 86 year old female presents for 6 month f/u. States when she was down in [place] went to urgent care for chest pain who sent her to ED. She was told she did not had costocondritis and to take advil. Since pain has resolved. She does also admit to GERD sx and takes omeprazole PRN. She has a stress test appt with cardio this week.\par Also admits to lung nodule that was found incidentally on CT about 4 years ago. Saw pulm at that time who said "not to worry about it". She has not followed it up since.

## 2021-05-03 NOTE — PLAN
[FreeTextEntry1] : Lung nodule\par -f/u CXR\par \par HTN\par  - controlled \par - cont current meds\par \par HLD\par - f/u lipid panel\par - cont current meds\par \par Vit D def\par - f/u levels\par \par HCM\par - mammo ref given\par \par f/u in 6 months or PRN

## 2021-05-04 LAB
25(OH)D3 SERPL-MCNC: 39.5 NG/ML
ALBUMIN SERPL ELPH-MCNC: 4.4 G/DL
ALP BLD-CCNC: 92 U/L
ALT SERPL-CCNC: 35 U/L
ANION GAP SERPL CALC-SCNC: 14 MMOL/L
AST SERPL-CCNC: 32 U/L
BASOPHILS # BLD AUTO: 0.06 K/UL
BASOPHILS NFR BLD AUTO: 1.1 %
BILIRUB SERPL-MCNC: 0.5 MG/DL
BUN SERPL-MCNC: 12 MG/DL
CALCIUM SERPL-MCNC: 9.9 MG/DL
CHLORIDE SERPL-SCNC: 100 MMOL/L
CHOLEST SERPL-MCNC: 147 MG/DL
CO2 SERPL-SCNC: 26 MMOL/L
CREAT SERPL-MCNC: 0.78 MG/DL
EOSINOPHIL # BLD AUTO: 0.11 K/UL
EOSINOPHIL NFR BLD AUTO: 2 %
ESTIMATED AVERAGE GLUCOSE: 137 MG/DL
GLUCOSE SERPL-MCNC: 128 MG/DL
HBA1C MFR BLD HPLC: 6.4 %
HCT VFR BLD CALC: 41.1 %
HDLC SERPL-MCNC: 59 MG/DL
HGB BLD-MCNC: 13 G/DL
IMM GRANULOCYTES NFR BLD AUTO: 0.4 %
LDLC SERPL CALC-MCNC: 68 MG/DL
LYMPHOCYTES # BLD AUTO: 1.42 K/UL
LYMPHOCYTES NFR BLD AUTO: 25.4 %
MAN DIFF?: NORMAL
MCHC RBC-ENTMCNC: 29.8 PG
MCHC RBC-ENTMCNC: 31.6 GM/DL
MCV RBC AUTO: 94.3 FL
MONOCYTES # BLD AUTO: 0.44 K/UL
MONOCYTES NFR BLD AUTO: 7.9 %
NEUTROPHILS # BLD AUTO: 3.55 K/UL
NEUTROPHILS NFR BLD AUTO: 63.2 %
NONHDLC SERPL-MCNC: 87 MG/DL
PLATELET # BLD AUTO: 264 K/UL
POTASSIUM SERPL-SCNC: 4.6 MMOL/L
PROT SERPL-MCNC: 6.7 G/DL
RBC # BLD: 4.36 M/UL
RBC # FLD: 13.3 %
SODIUM SERPL-SCNC: 140 MMOL/L
TRIGL SERPL-MCNC: 98 MG/DL
TSH SERPL-ACNC: 2.18 UIU/ML
WBC # FLD AUTO: 5.6 K/UL

## 2021-05-05 NOTE — PATIENT PROFILE ADULT - NSPROIMPLANTSMEDDEV_GEN_A_NUR
[facility] Hematology and Oncology: Office Visit Established Patient    Chief Complaint:    No chief complaint on file. History of Present Illness:  Mr. Fransisco Gotti is a 48 y.o. male who returns today for management of multiple myeloma. 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 Here for daily assessment in infusion, day +12 today. He reports having controlled nausea and has been taking Lomotil for loose stools. He had three episodes since yesterday, C diff checked per nursing per protocol. Appetite remains depressed, he is still eating bland foods/drinking Ensure. There is some pain relief with MMW-one sore on left bottom gums. No cough, shortness of breath, or edema. Fatigue is present as expected. No fever, chills, or other infectious symptoms.            Review of Systems:  10 point review of systems is otherwise negative with the exception of the elements mentioned above in the HPI. No Known Allergies  Past Medical History:   Diagnosis Date    Cancer ([address] dx 1/2021    multiple myeloma----    GERD (gastroesophageal reflux disease)     controlled with med    Headache     CLUSTER PER G' FRIEND     Past Surgical History:   Procedure Laterality Date    HX KNEE REPLACEMENT Left 06/2000    HX KYPHOPLASTY  08/2020    HX OTHER SURGICAL      bone marrow biopsy    HX TRANSPLANT      Autologous Stem cell transplant    IR INSERT TUNL CVC W/O PORT OVER 5 YR  3/31/2021    IR US GUIDE NDL PLACE  3/29/2021     History reviewed. No pertinent family history.   Social History     Socioeconomic History    Marital status: SINGLE     Spouse name: Not on file    Number of children: Not on file    Years of education: Not on file    Highest education level: Not on file Occupational History    Not on file   Social Needs    Financial resource strain: Not on file    Food insecurity     Worry: Not on file     Inability: Not on file    Transportation needs     Medical: Not on file     Non-medical: Not on file   Tobacco Use    Smoking status: Former Smoker     Packs/day: 1.00     Years: 4.00     Pack years: 4.00     Quit date:      Years since quittin.3    Smokeless tobacco: Never Used   Substance and Sexual Activity    Alcohol use: Not Currently     Frequency: Never     Binge frequency: Never    Drug use: Not Currently    Sexual activity: Not on file   Lifestyle    Physical activity     Days per week: Not on file     Minutes per session: Not on file    Stress: Not on file   Relationships    Social connections     Talks on phone: Not on file     Gets together: Not on file     Attends Jainism service: Not on file     Active member of club or organization: Not on file     Attends meetings of clubs or organizations: Not on file     Relationship status: Not on file    Intimate partner violence     Fear of current or ex partner: Not on file     Emotionally abused: Not on file     Physically abused: Not on file     Forced sexual activity: Not on file   Other Topics Concern     Service Not Asked    Blood Transfusions Not Asked    Caffeine Concern Not Asked    Occupational Exposure Not Asked   Jamie Donning Hazards Not Asked    Sleep Concern Not Asked    Stress Concern Not Asked    Weight Concern Not Asked    Special Diet Not Asked    Back Care Not Asked    Exercise Not Asked    Bike Helmet Not Asked    Seat Belt Not Asked    Self-Exams Not Asked   Social History Narrative    Not on file     Current Outpatient Medications   Medication Sig Dispense Refill    potassium, sodium phosphates (Phos-NaK) 280-160-250 mg packet Take 1 Packet by mouth three (3) times daily for 3 days.  Indications: low amount of phosphate in the blood 9 Packet 0    magic mouthwash (DAVID) susp Take 5 mL by mouth Before breakfast, lunch, and dinner. Magic Mouthwash  Maalox  Lidocaine 2% viscous  Diphenhydramine oral solution     Pharmacy to mix equal portions of ingredients to a total volume as indicated in the dispense amount 280 mL 3    loperamide (Imodium A-D) 2 mg tablet Take 1 Tab by mouth four (4) times daily as needed for Diarrhea for up to 15 days. 60 Tab 0    prochlorperazine (Compazine) 10 mg tablet Take 1 Tab by mouth every six (6) hours as needed for Nausea or Vomiting. 90 Tab 2    ondansetron hcl (ZOFRAN) 8 mg tablet Take 1 Tab by mouth every eight (8) hours as needed for Nausea or Vomiting. 90 Tab 1    acyclovir (ZOVIRAX) 400 mg tablet Take 1 Tab by mouth two (2) times a day. 60 Tab 5    amoxicillin-clavulanate (Augmentin) 875-125 mg per tablet Take 1 Tab by mouth two (2) times a day for 14 days. 28 Tab 0    fluconazole (Diflucan) 200 mg tablet Take 1 Tab by mouth daily for 14 days. FDA advises cautious prescribing of oral fluconazole in pregnancy. 14 Tab 0    levoFLOXacin (Levaquin) 500 mg tablet Take 1 Tab by mouth daily for 14 days. 14 Tab 0    gabapentin (NEURONTIN) 300 mg capsule Take 1 Cap by mouth three (3) times daily for 30 days. 90 Cap 3    cyclobenzaprine (FLEXERIL) 10 mg tablet Take 1 Tab by mouth as needed for Muscle Spasm(s). 30 Tab 1    zolpidem (AMBIEN) 5 mg tablet Take 1 Tab by mouth nightly as needed for Sleep. Max Daily Amount: 5 mg. 30 Tab 2    morphine CR (MS CONTIN) 60 mg CR tablet Take 1 Tab by mouth every twelve (12) hours for 30 days. Max Daily Amount: 120 mg. 60 Tab 0    oxyCODONE-acetaminophen (PERCOCET 10)  mg per tablet Take 1 Tab by mouth every four (4) hours as needed for Pain for up to 30 days. Max Daily Amount: 6 Tabs. 180 Tab 0    vitamin k 100 mcg tablet Take 100 mcg by mouth daily.  cyanocobalamin (Vitamin B-12) 100 mcg tablet Take 100 mcg by mouth daily.       melatonin 3 mg tablet TAKE 1 TABLET BY MOUTH EVERY NIGHT AS NEEDED FOR SLEEP      potassium chloride (K-DUR, KLOR-CON) 20 mEq tablet Take 1 Tab by mouth two (2) times a day. (Patient taking differently: Take 20 mEq by mouth daily.) 60 Tab 0    cholecalciferol (VITAMIN D3) (1000 Units /25 mcg) tablet Take 1,000 Units by mouth daily.  LORazepam (ATIVAN) 0.5 mg tablet Take 0.5 mg by mouth daily.  montelukast (SINGULAIR) 10 mg tablet nightly.  pantoprazole (PROTONIX) 40 mg tablet daily.  aspirin (ASPIRIN) 325 mg tablet Take 1 Tab by mouth daily. Per patient instructed to hold for 7 days--pt stopped 3/20/21       Current Facility-Administered Medications   Medication Dose Route Frequency Provider Last Rate Last Admin    central line flush (saline) syringe 10 mL  10 mL InterCATHeter Q8H Lesley Diane MD        tbo-filgrastim [facility]) injection 300 mcg  300 mcg SubCUTAneous ONCE Lesley Diane MD         Facility-Administered Medications Ordered in Other Encounters   Medication Dose Route Frequency Provider Last Rate Last Admin    central line flush (saline) syringe 20 mL  20 mL InterCATHeter Q8H Lesley Diane MD   20 mL at 05/04/21 1128    loperamide (IMODIUM) capsule 4 mg  4 mg Oral PRN Stas Connor, NP   4 mg at 05/04/21 1028       OBJECTIVE:  Visit Vitals  BP 92/62 (BP 1 Location: Left upper arm, BP Patient Position: Sitting)   Pulse (!) 116   Temp 97.9 °F (36.6 °C)   Resp 18   Wt 163 lb 3.2 oz (74 kg)   BMI 23.42 kg/m²       Physical Exam:  Constitutional: Well developed, well nourished male in no acute distress, sitting comfortably on the examination table. HEENT: Normocephalic and atraumatic. Oropharynx with some mild erythema. Mucous membranes are moist.  Sclerae anicteric. Neck supple. Skin Warm and dry. No bruising and no rash noted. No erythema. No pallor. Respiratory Lungs are clear to auscultation bilaterally without wheezes, rales or rhonchi, normal air exchange without accessory muscle use.     CVS Mild tahcyardic rate, regular rhythm and normal S1 and S2. No murmurs, gallops, or rubs. Abdomen Soft, nontender and nondistended, normoactive bowel sounds. No palpable mass. Neuro Grossly nonfocal with no obvious sensory or motor deficits. MSK Normal range of motion in general.  No edema and no tenderness. Psych Appropriate mood and affect. Labs:  Recent Results (from the past 24 hour(s))   CBC WITH AUTOMATED DIFF    Collection Time: 05/05/21  7:53 AM   Result Value Ref Range    WBC 2.2 (L) 4.3 - 11.1 K/uL    RBC 3.81 (L) 4.23 - 5.6 M/uL    HGB 10.0 (L) 13.6 - 17.2 g/dL    HCT 30.0 %    MCV 78.7 (L) 79.6 - 97.8 FL    MCH 26.2 26.1 - 32.9 PG    MCHC 33.3 31.4 - 35.0 g/dL    RDW 15.9 (H) 11.9 - 14.6 %    PLATELET 42 (L) 851 - 450 K/uL    MPV 10.1 9.4 - 12.3 FL    ABSOLUTE NRBC 0.00 0.0 - 0.2 K/uL    DF PENDING    GGT    Collection Time: 05/05/21  7:53 AM   Result Value Ref Range    GGT 89 (H) 15 - 85 U/L   LD    Collection Time: 05/05/21  7:53 AM   Result Value Ref Range     100 - 190 U/L   MAGNESIUM    Collection Time: 05/05/21  7:53 AM   Result Value Ref Range    Magnesium 2.2 1.8 - 2.4 mg/dL   METABOLIC PANEL, COMPREHENSIVE    Collection Time: 05/05/21  7:53 AM   Result Value Ref Range    Sodium 139 136 - 145 mmol/L    Potassium 3.2 (L) 3.5 - 5.1 mmol/L    Chloride 107 98 - 107 mmol/L    CO2 25 21 - 32 mmol/L    Anion gap 7 7 - 16 mmol/L    Glucose 94 65 - 100 mg/dL    BUN 8 6 - 23 MG/DL    Creatinine 0.50 (L) 0.8 - 1.5 MG/DL    GFR est AA >60 >60 ml/min/1.73m2    GFR est non-AA >60 >60 ml/min/1.73m2    Calcium 7.7 (L) 8.3 - 10.4 MG/DL    Bilirubin, total 0.3 0.2 - 1.1 MG/DL    ALT (SGPT) 14 12 - 65 U/L    AST (SGOT) 7 (L) 15 - 37 U/L    Alk.  phosphatase 76 50 - 136 U/L    Protein, total 5.9 (L) 6.3 - 8.2 g/dL    Albumin 2.4 (L) 3.5 - 5.0 g/dL    Globulin 3.5 2.3 - 3.5 g/dL    A-G Ratio 0.7 (L) 1.2 - 3.5     PHOSPHORUS    Collection Time: 05/05/21  7:53 AM   Result Value Ref Range    Phosphorus 1.6 (L) 2.5 - 4.5 MG/DL        Imaging:  No procedure found. ASSESSMENT:    ICD-10-CM ICD-9-CM    1. Multiple myeloma not having achieved remission ([facility])  C90.00 203.00 dextrose 5% - 0.45% NaCl with KCl 20 mEq/L infusion 1,000 mL      tbo-filgrastim (GRANIX) injection 300 mcg           PLAN:  Lab studies were personally reviewed. MM: diagnosed 2020, completed 4 cycles of RVd with SD, now autoSCT with D0 on 4/23/21. Here for daily assessment in infusion, day +12 today. He reports having controlled nausea and has been taking Lomotil for loose stools. He had three episodes since yesterday, C diff checked per nursing per protocol. Appetite remains depressed, he is still eating bland foods/drinking Ensure. There is some pain relief with MMW-one sore on left bottom gums. No cough, shortness of breath, or edema. Fatigue is present as expected. No fever, chills, or other infectious symptoms. Labs reviewed and WBC 2.2, Hgb 10, Plt 42k. He will receive Granix. CMP with K 3.2, will receive IV replacement, phos low at 1.6 and sent phos packets to pharmacy x 3 days. He will receive fluids for hypotension, occasional dizziness with standing but no falls, using walker at home. C diff pending.                Kelly Briscoe NP  [facility] Hematology and On[facility]  [address] [place] [address]  Office : [phone]  Fax : [phone] None

## 2021-05-27 ENCOUNTER — APPOINTMENT (OUTPATIENT)
Dept: FAMILY MEDICINE | Facility: CLINIC | Age: 86
End: 2021-05-27
Payer: MEDICARE

## 2021-05-27 VITALS
HEIGHT: 66 IN | BODY MASS INDEX: 25.39 KG/M2 | TEMPERATURE: 97.7 F | OXYGEN SATURATION: 98 % | WEIGHT: 158 LBS | HEART RATE: 102 BPM | SYSTOLIC BLOOD PRESSURE: 122 MMHG | DIASTOLIC BLOOD PRESSURE: 74 MMHG

## 2021-05-27 DIAGNOSIS — R20.2 PARESTHESIA OF SKIN: ICD-10-CM

## 2021-05-27 PROCEDURE — 99213 OFFICE O/P EST LOW 20 MIN: CPT

## 2021-05-27 NOTE — PHYSICAL EXAM
[No Acute Distress] : no acute distress [Well-Appearing] : well-appearing [Normal Sclera/Conjunctiva] : normal sclera/conjunctiva [Normal Outer Ear/Nose] : the outer ears and nose were normal in appearance [No Respiratory Distress] : no respiratory distress  [No Accessory Muscle Use] : no accessory muscle use [Clear to Auscultation] : lungs were clear to auscultation bilaterally [Normal Rate] : normal rate  [Regular Rhythm] : with a regular rhythm [No Rash] : no rash [No Focal Deficits] : no focal deficits [Normal Gait] : normal gait [Normal Affect] : the affect was normal [Normal Insight/Judgement] : insight and judgment were intact

## 2021-05-27 NOTE — HISTORY OF PRESENT ILLNESS
[FreeTextEntry8] : 86 year old female presents with numbness and tingling in legs. States this has been going on for past few months but worsening over past few weeks. States she feels fine when she sits. When she stands she gets symptoms which resolve after walking a few minutes. No severe pain. Was recommended to see vascular.

## 2021-05-27 NOTE — PLAN
[FreeTextEntry1] : Paresthesia\par - f/u labs\par - vascular referral\par - pt had stopped taking B12 supplements .Advised to cont and will f/u levels

## 2021-05-28 LAB
ALBUMIN SERPL ELPH-MCNC: 4.5 G/DL
ALP BLD-CCNC: 95 U/L
ALT SERPL-CCNC: 28 U/L
ANION GAP SERPL CALC-SCNC: 13 MMOL/L
AST SERPL-CCNC: 20 U/L
B BURGDOR AB SER-IMP: NEGATIVE
B BURGDOR IGG+IGM SER QL: 0.04 INDEX
BASOPHILS # BLD AUTO: 0.05 K/UL
BASOPHILS NFR BLD AUTO: 0.8 %
BILIRUB SERPL-MCNC: 0.4 MG/DL
BUN SERPL-MCNC: 15 MG/DL
CALCIUM SERPL-MCNC: 9.7 MG/DL
CHLORIDE SERPL-SCNC: 102 MMOL/L
CK SERPL-CCNC: 100 U/L
CO2 SERPL-SCNC: 25 MMOL/L
CREAT SERPL-MCNC: 0.8 MG/DL
CRP SERPL-MCNC: <3 MG/L
EOSINOPHIL # BLD AUTO: 0.1 K/UL
EOSINOPHIL NFR BLD AUTO: 1.6 %
ERYTHROCYTE [SEDIMENTATION RATE] IN BLOOD BY WESTERGREN METHOD: 44 MM/HR
FOLATE SERPL-MCNC: 15.8 NG/ML
GLUCOSE SERPL-MCNC: 192 MG/DL
HCT VFR BLD CALC: 40.6 %
HGB BLD-MCNC: 12.8 G/DL
IMM GRANULOCYTES NFR BLD AUTO: 0.5 %
LYMPHOCYTES # BLD AUTO: 1.48 K/UL
LYMPHOCYTES NFR BLD AUTO: 24.2 %
MAN DIFF?: NORMAL
MCHC RBC-ENTMCNC: 29.7 PG
MCHC RBC-ENTMCNC: 31.5 GM/DL
MCV RBC AUTO: 94.2 FL
MONOCYTES # BLD AUTO: 0.42 K/UL
MONOCYTES NFR BLD AUTO: 6.9 %
NEUTROPHILS # BLD AUTO: 4.04 K/UL
NEUTROPHILS NFR BLD AUTO: 66 %
PLATELET # BLD AUTO: 280 K/UL
POTASSIUM SERPL-SCNC: 3.9 MMOL/L
PROT SERPL-MCNC: 6.9 G/DL
RBC # BLD: 4.31 M/UL
RBC # FLD: 13.2 %
SODIUM SERPL-SCNC: 140 MMOL/L
VIT B12 SERPL-MCNC: 467 PG/ML
WBC # FLD AUTO: 6.12 K/UL

## 2021-06-07 LAB
ANAPLASMA PHAGOCYTOPHILIA IGG ANTIBODIES: NEGATIVE
ANAPLASMA PHAGOCYTOPHILIA IGM ANTIBODIES: NEGATIVE
BABESIA ANTIBODIES, IGG: NORMAL
BABESIA ANTIBODIES, IGM: NORMAL
EHRLICIA CHAFFEENIS IGG ANTIBODIES: NEGATIVE
EHRLICIA CHAFFEENIS IGM ANTIBODIES: NEGATIVE

## 2021-07-13 ENCOUNTER — RX RENEWAL (OUTPATIENT)
Age: 86
End: 2021-07-13

## 2021-07-19 ENCOUNTER — TRANSCRIPTION ENCOUNTER (OUTPATIENT)
Age: 86
End: 2021-07-19

## 2021-09-29 ENCOUNTER — APPOINTMENT (OUTPATIENT)
Dept: FAMILY MEDICINE | Facility: CLINIC | Age: 86
End: 2021-09-29
Payer: MEDICARE

## 2021-09-29 ENCOUNTER — NON-APPOINTMENT (OUTPATIENT)
Age: 86
End: 2021-09-29

## 2021-09-29 VITALS
HEART RATE: 83 BPM | OXYGEN SATURATION: 94 % | BODY MASS INDEX: 25.5 KG/M2 | TEMPERATURE: 97.2 F | SYSTOLIC BLOOD PRESSURE: 124 MMHG | DIASTOLIC BLOOD PRESSURE: 76 MMHG | WEIGHT: 158 LBS

## 2021-09-29 DIAGNOSIS — Z23 ENCOUNTER FOR IMMUNIZATION: ICD-10-CM

## 2021-09-29 DIAGNOSIS — R07.81 PLEURODYNIA: ICD-10-CM

## 2021-09-29 DIAGNOSIS — R10.13 EPIGASTRIC PAIN: ICD-10-CM

## 2021-09-29 PROCEDURE — 99204 OFFICE O/P NEW MOD 45 MIN: CPT | Mod: 25

## 2021-09-29 PROCEDURE — 36415 COLL VENOUS BLD VENIPUNCTURE: CPT

## 2021-09-29 PROCEDURE — 99214 OFFICE O/P EST MOD 30 MIN: CPT | Mod: 25

## 2021-09-29 PROCEDURE — G0008: CPT

## 2021-09-29 PROCEDURE — 90662 IIV NO PRSV INCREASED AG IM: CPT

## 2021-09-29 RX ORDER — FLUTICASONE PROPIONATE 50 UG/1
50 SPRAY, METERED NASAL
Qty: 1 | Refills: 0 | Status: ACTIVE | COMMUNITY
Start: 2021-09-29 | End: 1900-01-01

## 2021-09-29 NOTE — HEALTH RISK ASSESSMENT
[Good] : ~his/her~  mood as  good [] : No [Yes] : Yes [4 or more  times a week (4 pts)] : 4 or more  times a week (4 points) [1 or 2 (0 pts)] : 1 or 2 (0 points) [Never (0 pts)] : Never (0 points) [No] : In the past 12 months have you used drugs other than those required for medical reasons? No [No falls in past year] : Patient reported no falls in the past year [0] : 2) Feeling down, depressed, or hopeless: Not at all (0) [None] : None [Alone] : lives alone [Retired] : retired [Graduate School] : graduate school [Feels Safe at Home] : Feels safe at home [Fully functional (bathing, dressing, toileting, transferring, walking, feeding)] : Fully functional (bathing, dressing, toileting, transferring, walking, feeding) [Fully functional (using the telephone, shopping, preparing meals, housekeeping, doing laundry, using] : Fully functional and needs no help or supervision to perform IADLs (using the telephone, shopping, preparing meals, housekeeping, doing laundry, using transportation, managing medications and managing finances) [Reports changes in hearing] : Reports no changes in hearing [Reports changes in vision] : Reports no changes in vision [Reports changes in dental health] : Reports no changes in dental health [MammogramDate] : 2021

## 2021-09-29 NOTE — PHYSICAL EXAM
[No Acute Distress] : no acute distress [Well Nourished] : well nourished [Well Developed] : well developed [Well-Appearing] : well-appearing [Normal Voice/Communication] : normal voice/communication [Normal Sclera/Conjunctiva] : normal sclera/conjunctiva [PERRL] : pupils equal round and reactive to light [EOMI] : extraocular movements intact [Normal Outer Ear/Nose] : the outer ears and nose were normal in appearance [Normal Oropharynx] : the oropharynx was normal [No JVD] : no jugular venous distention [No Respiratory Distress] : no respiratory distress  [No Accessory Muscle Use] : no accessory muscle use [Clear to Auscultation] : lungs were clear to auscultation bilaterally [Normal Rate] : normal rate  [Regular Rhythm] : with a regular rhythm [Normal S1, S2] : normal S1 and S2 [No Murmur] : no murmur heard [No Edema] : there was no peripheral edema [Soft] : abdomen soft [Non-distended] : non-distended [No Masses] : no abdominal mass palpated [No HSM] : no HSM [Normal Bowel Sounds] : normal bowel sounds [No CVA Tenderness] : no CVA  tenderness [No Spinal Tenderness] : no spinal tenderness [No Joint Swelling] : no joint swelling [Grossly Normal Strength/Tone] : grossly normal strength/tone [No Rash] : no rash [Coordination Grossly Intact] : coordination grossly intact [No Focal Deficits] : no focal deficits [Normal Gait] : normal gait [Speech Grossly Normal] : speech grossly normal [Normal Affect] : the affect was normal [Alert and Oriented x3] : oriented to person, place, and time [Normal Mood] : the mood was normal [Normal Insight/Judgement] : insight and judgment were intact [de-identified] : epigastric tenderness   [de-identified] : lower rib bilateral tenderness - no bruising

## 2021-09-29 NOTE — HISTORY OF PRESENT ILLNESS
[FreeTextEntry1] : patient here to establish physical care.flu shot. [de-identified] : admits to 6 months of epigastric pain and lower rib tenderness - no fall - azam was started on omeprazole which helps a a lot - states discomfort is mostly when she lays down at night - intermittently with get discomfort during the day - states drink to alcoholic drinks a day - 5pm a coctail and 7 pm small glass of wine - states has been known to eat late at night and then go to bed . no chest pain, no sob, no cough, no fever, no dizziness, no n/v/d/c/melena/brbpr/hematuria/dysuria\par admitsto chronic phelgm and post nasal drip

## 2021-09-29 NOTE — ASSESSMENT
[FreeTextEntry1] : get records from cardio - states just had a stress test \par followup repeat labs \par continue meds

## 2021-10-01 LAB
25(OH)D3 SERPL-MCNC: 41.2 NG/ML
ALBUMIN SERPL ELPH-MCNC: 4.4 G/DL
ALP BLD-CCNC: 105 U/L
ALT SERPL-CCNC: 19 U/L
ANION GAP SERPL CALC-SCNC: 12 MMOL/L
APPEARANCE: CLEAR
AST SERPL-CCNC: 16 U/L
BACTERIA: NEGATIVE
BASOPHILS # BLD AUTO: 0.04 K/UL
BASOPHILS NFR BLD AUTO: 0.6 %
BILIRUB SERPL-MCNC: 0.6 MG/DL
BILIRUBIN URINE: NEGATIVE
BLOOD URINE: NEGATIVE
BUN SERPL-MCNC: 12 MG/DL
CALCIUM SERPL-MCNC: 9.8 MG/DL
CHLORIDE SERPL-SCNC: 100 MMOL/L
CHOLEST SERPL-MCNC: 152 MG/DL
CO2 SERPL-SCNC: 27 MMOL/L
COLOR: NORMAL
COVID-19 NUCLEOCAPSID  GAM ANTIBODY INTERPRETATION: NEGATIVE
COVID-19 SPIKE DOMAIN ANTIBODY INTERPRETATION: POSITIVE
CREAT SERPL-MCNC: 0.82 MG/DL
EOSINOPHIL # BLD AUTO: 0.1 K/UL
EOSINOPHIL NFR BLD AUTO: 1.5 %
ERYTHROCYTE [SEDIMENTATION RATE] IN BLOOD BY WESTERGREN METHOD: 29 MM/HR
ESTIMATED AVERAGE GLUCOSE: 137 MG/DL
GLUCOSE QUALITATIVE U: NEGATIVE
GLUCOSE SERPL-MCNC: 99 MG/DL
HBA1C MFR BLD HPLC: 6.4 %
HCT VFR BLD CALC: 42.1 %
HDLC SERPL-MCNC: 57 MG/DL
HGB BLD-MCNC: 12.8 G/DL
HYALINE CASTS: 2 /LPF
IMM GRANULOCYTES NFR BLD AUTO: 0.3 %
KETONES URINE: NEGATIVE
LDLC SERPL CALC-MCNC: 66 MG/DL
LEUKOCYTE ESTERASE URINE: ABNORMAL
LPL SERPL-CCNC: 20 U/L
LYMPHOCYTES # BLD AUTO: 1.57 K/UL
LYMPHOCYTES NFR BLD AUTO: 23.8 %
MAN DIFF?: NORMAL
MCHC RBC-ENTMCNC: 29 PG
MCHC RBC-ENTMCNC: 30.4 GM/DL
MCV RBC AUTO: 95.2 FL
MICROSCOPIC-UA: NORMAL
MONOCYTES # BLD AUTO: 0.47 K/UL
MONOCYTES NFR BLD AUTO: 7.1 %
NEUTROPHILS # BLD AUTO: 4.39 K/UL
NEUTROPHILS NFR BLD AUTO: 66.7 %
NITRITE URINE: NEGATIVE
NONHDLC SERPL-MCNC: 96 MG/DL
PH URINE: 6
PLATELET # BLD AUTO: 291 K/UL
POTASSIUM SERPL-SCNC: 4.2 MMOL/L
PROT SERPL-MCNC: 6.9 G/DL
PROTEIN URINE: NEGATIVE
RBC # BLD: 4.42 M/UL
RBC # FLD: 13.2 %
RED BLOOD CELLS URINE: 1 /HPF
SARS-COV-2 AB SERPL IA-ACNC: 160 U/ML
SARS-COV-2 AB SERPL QL IA: 0.09 INDEX
SODIUM SERPL-SCNC: 139 MMOL/L
SPECIFIC GRAVITY URINE: 1.01
SQUAMOUS EPITHELIAL CELLS: 2 /HPF
TRIGL SERPL-MCNC: 146 MG/DL
UROBILINOGEN URINE: NORMAL
WBC # FLD AUTO: 6.59 K/UL
WHITE BLOOD CELLS URINE: 6 /HPF

## 2021-10-21 ENCOUNTER — OUTPATIENT (OUTPATIENT)
Dept: OUTPATIENT SERVICES | Facility: HOSPITAL | Age: 86
LOS: 1 days | End: 2021-10-21
Payer: MEDICARE

## 2021-10-21 ENCOUNTER — APPOINTMENT (OUTPATIENT)
Dept: ULTRASOUND IMAGING | Facility: CLINIC | Age: 86
End: 2021-10-21
Payer: MEDICARE

## 2021-10-21 DIAGNOSIS — R10.13 EPIGASTRIC PAIN: ICD-10-CM

## 2021-10-21 DIAGNOSIS — Z90.710 ACQUIRED ABSENCE OF BOTH CERVIX AND UTERUS: Chronic | ICD-10-CM

## 2021-10-21 DIAGNOSIS — Z90.49 ACQUIRED ABSENCE OF OTHER SPECIFIED PARTS OF DIGESTIVE TRACT: Chronic | ICD-10-CM

## 2021-10-21 DIAGNOSIS — Z98.890 OTHER SPECIFIED POSTPROCEDURAL STATES: Chronic | ICD-10-CM

## 2021-10-21 DIAGNOSIS — Z41.9 ENCOUNTER FOR PROCEDURE FOR PURPOSES OTHER THAN REMEDYING HEALTH STATE, UNSPECIFIED: Chronic | ICD-10-CM

## 2021-10-21 PROCEDURE — 76700 US EXAM ABDOM COMPLETE: CPT | Mod: 26

## 2021-10-21 PROCEDURE — 76700 US EXAM ABDOM COMPLETE: CPT

## 2021-11-01 ENCOUNTER — NON-APPOINTMENT (OUTPATIENT)
Age: 86
End: 2021-11-01

## 2021-11-15 ENCOUNTER — TRANSCRIPTION ENCOUNTER (OUTPATIENT)
Age: 86
End: 2021-11-15

## 2021-11-16 ENCOUNTER — APPOINTMENT (OUTPATIENT)
Dept: FAMILY MEDICINE | Facility: CLINIC | Age: 86
End: 2021-11-16
Payer: MEDICARE

## 2021-11-16 ENCOUNTER — RESULT REVIEW (OUTPATIENT)
Age: 86
End: 2021-11-16

## 2021-11-16 VITALS
BODY MASS INDEX: 25.5 KG/M2 | TEMPERATURE: 96.7 F | SYSTOLIC BLOOD PRESSURE: 126 MMHG | DIASTOLIC BLOOD PRESSURE: 70 MMHG | HEART RATE: 80 BPM | WEIGHT: 158 LBS | OXYGEN SATURATION: 92 %

## 2021-11-16 DIAGNOSIS — M25.519 PAIN IN UNSPECIFIED SHOULDER: ICD-10-CM

## 2021-11-16 DIAGNOSIS — M79.601 PAIN IN RIGHT ARM: ICD-10-CM

## 2021-11-16 DIAGNOSIS — R91.1 SOLITARY PULMONARY NODULE: ICD-10-CM

## 2021-11-16 PROCEDURE — 99214 OFFICE O/P EST MOD 30 MIN: CPT

## 2021-11-16 NOTE — HISTORY OF PRESENT ILLNESS
[FreeTextEntry8] : patient here with a right shoulder pain since a month an a half   - states ever since her booster vaccine her arm sore intermittent - states mostly at night when she lays down . no current pain . no swelling ,. states bother shoulder hurt her right is right then left and discomfort at lateral bicep/tricep region \par no chest pain, no sob, no cough, no fever, no dizziness, no abdominal pain, no n/v/d/c/melena/brbpr/hematuria/dysuria\par also admits to moy a lung nodule she never followed up on - went to pulmonology who told her not to worry about it but doesn’t remember if he said it needs to be monitored . - dr rosen

## 2021-11-16 NOTE — PHYSICAL EXAM
[No Acute Distress] : no acute distress [Well Nourished] : well nourished [Well Developed] : well developed [Well-Appearing] : well-appearing [Normal Voice/Communication] : normal voice/communication [Normal Sclera/Conjunctiva] : normal sclera/conjunctiva [Normal Outer Ear/Nose] : the outer ears and nose were normal in appearance [No JVD] : no jugular venous distention [No Respiratory Distress] : no respiratory distress  [No Accessory Muscle Use] : no accessory muscle use [Clear to Auscultation] : lungs were clear to auscultation bilaterally [Normal Rate] : normal rate  [Regular Rhythm] : with a regular rhythm [Normal S1, S2] : normal S1 and S2 [No Edema] : there was no peripheral edema [Soft] : abdomen soft [Non Tender] : non-tender [Non-distended] : non-distended [No Spinal Tenderness] : no spinal tenderness [No Joint Swelling] : no joint swelling [Grossly Normal Strength/Tone] : grossly normal strength/tone [No Rash] : no rash [Coordination Grossly Intact] : coordination grossly intact [No Focal Deficits] : no focal deficits [Normal Gait] : normal gait [Speech Grossly Normal] : speech grossly normal [Normal Affect] : the affect was normal [Alert and Oriented x3] : oriented to person, place, and time [Normal Mood] : the mood was normal [Normal Insight/Judgement] : insight and judgment were intact [de-identified] : left shoulder discomfort with internal rotation - and with flexion of right shoulder - nontender arms and shoulders to palpation, no swelling

## 2021-11-18 ENCOUNTER — OUTPATIENT (OUTPATIENT)
Dept: OUTPATIENT SERVICES | Facility: HOSPITAL | Age: 86
LOS: 1 days | End: 2021-11-18
Payer: MEDICARE

## 2021-11-18 ENCOUNTER — APPOINTMENT (OUTPATIENT)
Dept: RADIOLOGY | Facility: CLINIC | Age: 86
End: 2021-11-18
Payer: MEDICARE

## 2021-11-18 DIAGNOSIS — Z90.49 ACQUIRED ABSENCE OF OTHER SPECIFIED PARTS OF DIGESTIVE TRACT: Chronic | ICD-10-CM

## 2021-11-18 DIAGNOSIS — Z41.9 ENCOUNTER FOR PROCEDURE FOR PURPOSES OTHER THAN REMEDYING HEALTH STATE, UNSPECIFIED: Chronic | ICD-10-CM

## 2021-11-18 DIAGNOSIS — Z90.710 ACQUIRED ABSENCE OF BOTH CERVIX AND UTERUS: Chronic | ICD-10-CM

## 2021-11-18 DIAGNOSIS — M25.519 PAIN IN UNSPECIFIED SHOULDER: ICD-10-CM

## 2021-11-18 DIAGNOSIS — Z98.890 OTHER SPECIFIED POSTPROCEDURAL STATES: Chronic | ICD-10-CM

## 2021-11-18 DIAGNOSIS — M79.601 PAIN IN RIGHT ARM: ICD-10-CM

## 2021-11-18 PROCEDURE — 73060 X-RAY EXAM OF HUMERUS: CPT

## 2021-11-18 PROCEDURE — 73060 X-RAY EXAM OF HUMERUS: CPT | Mod: 26,RT

## 2021-11-18 PROCEDURE — 73030 X-RAY EXAM OF SHOULDER: CPT | Mod: 26,50

## 2021-11-18 PROCEDURE — 73030 X-RAY EXAM OF SHOULDER: CPT

## 2021-11-30 ENCOUNTER — RX RENEWAL (OUTPATIENT)
Age: 86
End: 2021-11-30

## 2021-12-02 ENCOUNTER — RX RENEWAL (OUTPATIENT)
Age: 86
End: 2021-12-02

## 2021-12-03 ENCOUNTER — RX RENEWAL (OUTPATIENT)
Age: 86
End: 2021-12-03

## 2021-12-07 ENCOUNTER — APPOINTMENT (OUTPATIENT)
Dept: FAMILY MEDICINE | Facility: CLINIC | Age: 86
End: 2021-12-07

## 2021-12-13 ENCOUNTER — APPOINTMENT (OUTPATIENT)
Dept: FAMILY MEDICINE | Facility: CLINIC | Age: 86
End: 2021-12-13
Payer: MEDICARE

## 2021-12-13 VITALS
TEMPERATURE: 97.2 F | SYSTOLIC BLOOD PRESSURE: 134 MMHG | HEART RATE: 87 BPM | BODY MASS INDEX: 25.5 KG/M2 | DIASTOLIC BLOOD PRESSURE: 80 MMHG | OXYGEN SATURATION: 96 % | WEIGHT: 158 LBS

## 2021-12-13 PROCEDURE — 99214 OFFICE O/P EST MOD 30 MIN: CPT | Mod: 25

## 2021-12-13 PROCEDURE — 36415 COLL VENOUS BLD VENIPUNCTURE: CPT

## 2021-12-13 NOTE — PHYSICAL EXAM
[No Acute Distress] : no acute distress [Well Nourished] : well nourished [Well Developed] : well developed [Well-Appearing] : well-appearing [Normal Voice/Communication] : normal voice/communication [Normal Sclera/Conjunctiva] : normal sclera/conjunctiva [Normal Outer Ear/Nose] : the outer ears and nose were normal in appearance [No JVD] : no jugular venous distention [No Respiratory Distress] : no respiratory distress  [No Accessory Muscle Use] : no accessory muscle use [Clear to Auscultation] : lungs were clear to auscultation bilaterally [Normal Rate] : normal rate  [Regular Rhythm] : with a regular rhythm [Normal S1, S2] : normal S1 and S2 [No Edema] : there was no peripheral edema [Soft] : abdomen soft [Non Tender] : non-tender [Non-distended] : non-distended [No Spinal Tenderness] : no spinal tenderness [No Joint Swelling] : no joint swelling [Grossly Normal Strength/Tone] : grossly normal strength/tone [No Rash] : no rash [Coordination Grossly Intact] : coordination grossly intact [No Focal Deficits] : no focal deficits [Normal Gait] : normal gait [Speech Grossly Normal] : speech grossly normal [Normal Affect] : the affect was normal [Alert and Oriented x3] : oriented to person, place, and time [Normal Mood] : the mood was normal [Normal Insight/Judgement] : insight and judgment were intact [de-identified] : pimple with white head on center of back - white head drained and cleaned with alcohol and bacitracin applied

## 2021-12-13 NOTE — HISTORY OF PRESENT ILLNESS
[FreeTextEntry1] : patient here for a follow up [de-identified] : no chest pain, no sob, no cough, no fever, no dizziness, no abdominal pain, no n/v/d/c/melena/brbpr/hematuria/dysuria\par

## 2021-12-21 LAB
25(OH)D3 SERPL-MCNC: 35.5 NG/ML
ALBUMIN SERPL ELPH-MCNC: 4.4 G/DL
ALP BLD-CCNC: 91 U/L
ALT SERPL-CCNC: 33 U/L
ANION GAP SERPL CALC-SCNC: 13 MMOL/L
APPEARANCE: CLEAR
AST SERPL-CCNC: 21 U/L
BACTERIA: NEGATIVE
BASOPHILS # BLD AUTO: 0.04 K/UL
BASOPHILS NFR BLD AUTO: 0.4 %
BILIRUB SERPL-MCNC: 0.4 MG/DL
BILIRUBIN URINE: NEGATIVE
BLOOD URINE: NEGATIVE
BUN SERPL-MCNC: 15 MG/DL
CALCIUM SERPL-MCNC: 10.1 MG/DL
CHLORIDE SERPL-SCNC: 98 MMOL/L
CHOLEST SERPL-MCNC: 158 MG/DL
CO2 SERPL-SCNC: 24 MMOL/L
COLOR: YELLOW
COVID-19 NUCLEOCAPSID  GAM ANTIBODY INTERPRETATION: NEGATIVE
COVID-19 SPIKE DOMAIN ANTIBODY INTERPRETATION: POSITIVE
CREAT SERPL-MCNC: 0.84 MG/DL
EOSINOPHIL # BLD AUTO: 0.07 K/UL
EOSINOPHIL NFR BLD AUTO: 0.8 %
ESTIMATED AVERAGE GLUCOSE: 140 MG/DL
GLUCOSE QUALITATIVE U: NEGATIVE
GLUCOSE SERPL-MCNC: 108 MG/DL
HBA1C MFR BLD HPLC: 6.5 %
HCT VFR BLD CALC: 42.4 %
HDLC SERPL-MCNC: 77 MG/DL
HGB BLD-MCNC: 13.3 G/DL
HYALINE CASTS: 3 /LPF
IMM GRANULOCYTES NFR BLD AUTO: 0.4 %
KETONES URINE: NEGATIVE
LDLC SERPL CALC-MCNC: 66 MG/DL
LEUKOCYTE ESTERASE URINE: NEGATIVE
LYMPHOCYTES # BLD AUTO: 1.3 K/UL
LYMPHOCYTES NFR BLD AUTO: 14.4 %
MAN DIFF?: NORMAL
MCHC RBC-ENTMCNC: 29.2 PG
MCHC RBC-ENTMCNC: 31.4 GM/DL
MCV RBC AUTO: 93.2 FL
MICROSCOPIC-UA: NORMAL
MONOCYTES # BLD AUTO: 0.66 K/UL
MONOCYTES NFR BLD AUTO: 7.3 %
NEUTROPHILS # BLD AUTO: 6.9 K/UL
NEUTROPHILS NFR BLD AUTO: 76.7 %
NITRITE URINE: NEGATIVE
NONHDLC SERPL-MCNC: 81 MG/DL
PH URINE: 5.5
PLATELET # BLD AUTO: 218 K/UL
POTASSIUM SERPL-SCNC: 4.1 MMOL/L
PROT SERPL-MCNC: 7 G/DL
PROTEIN URINE: NEGATIVE
RBC # BLD: 4.55 M/UL
RBC # FLD: 13.5 %
RED BLOOD CELLS URINE: 1 /HPF
SARS-COV-2 AB SERPL IA-ACNC: >250 U/ML
SARS-COV-2 AB SERPL QL IA: 0.08 INDEX
SODIUM SERPL-SCNC: 135 MMOL/L
SPECIFIC GRAVITY URINE: 1.02
SQUAMOUS EPITHELIAL CELLS: 2 /HPF
TRIGL SERPL-MCNC: 75 MG/DL
UROBILINOGEN URINE: NORMAL
WBC # FLD AUTO: 9.01 K/UL
WHITE BLOOD CELLS URINE: 2 /HPF

## 2022-01-05 ENCOUNTER — APPOINTMENT (OUTPATIENT)
Dept: FAMILY MEDICINE | Facility: CLINIC | Age: 87
End: 2022-01-05

## 2022-01-10 ENCOUNTER — RX RENEWAL (OUTPATIENT)
Age: 87
End: 2022-01-10

## 2022-01-19 ENCOUNTER — TRANSCRIPTION ENCOUNTER (OUTPATIENT)
Age: 87
End: 2022-01-19

## 2022-01-19 ENCOUNTER — NON-APPOINTMENT (OUTPATIENT)
Age: 87
End: 2022-01-19

## 2022-01-26 ENCOUNTER — RX RENEWAL (OUTPATIENT)
Age: 87
End: 2022-01-26

## 2022-02-17 ENCOUNTER — APPOINTMENT (OUTPATIENT)
Dept: FAMILY MEDICINE | Facility: CLINIC | Age: 87
End: 2022-02-17
Payer: MEDICARE

## 2022-02-17 DIAGNOSIS — K63.9 DISEASE OF INTESTINE, UNSPECIFIED: ICD-10-CM

## 2022-02-17 DIAGNOSIS — D73.89 OTHER DISEASES OF SPLEEN: ICD-10-CM

## 2022-02-17 DIAGNOSIS — N28.9 DISORDER OF KIDNEY AND URETER, UNSPECIFIED: ICD-10-CM

## 2022-02-17 PROCEDURE — 99213 OFFICE O/P EST LOW 20 MIN: CPT | Mod: 95

## 2022-04-27 ENCOUNTER — APPOINTMENT (OUTPATIENT)
Dept: ORTHOPEDIC SURGERY | Facility: CLINIC | Age: 87
End: 2022-04-27
Payer: MEDICARE

## 2022-04-27 DIAGNOSIS — M24.812 OTHER SPECIFIC JOINT DERANGEMENTS OF LEFT SHOULDER, NOT ELSEWHERE CLASSIFIED: ICD-10-CM

## 2022-04-27 DIAGNOSIS — M25.512 PAIN IN LEFT SHOULDER: ICD-10-CM

## 2022-04-27 DIAGNOSIS — M75.82 OTHER SHOULDER LESIONS, LEFT SHOULDER: ICD-10-CM

## 2022-04-27 DIAGNOSIS — M19.012 PRIMARY OSTEOARTHRITIS, LEFT SHOULDER: ICD-10-CM

## 2022-04-27 PROCEDURE — 99214 OFFICE O/P EST MOD 30 MIN: CPT | Mod: 25

## 2022-04-27 PROCEDURE — 73030 X-RAY EXAM OF SHOULDER: CPT | Mod: LT

## 2022-04-27 PROCEDURE — 20611 DRAIN/INJ JOINT/BURSA W/US: CPT | Mod: LT

## 2022-04-27 NOTE — PHYSICAL EXAM
[de-identified] : Neurologic: normal coordination, normal DTR UE/LE , normal sensation, normal mood and affect, orientated and able to communicate. \par Skin: normal skin, no rash, no ulcers and no lesions. \par Lymphatic: no obvious lymphadenopathy in areas examined. \par Constitutional: well developed and well nourished. \par Cardiovascular: peripheral vascular exam is grossly normal. \par Pulmonary: no respiratory distress, lungs clear to auscultation bilaterally. \par Abdomen: normal bowel sounds, non-tender, no HSM and no mass. \par \par Left shoulder:\par Positive Neer test\par Positive Berry test \par Positive Impingement test\par \par Supraspinatus strength: 4-/5\par \par X-ray of the - L shoulder (2-views) is a follows: glenohumeral OA

## 2022-04-27 NOTE — HISTORY OF PRESENT ILLNESS
[de-identified] : The patient is a 87 year old R hand dominant F who presents today complaining of LEFT SHOULDER PAIN.  \par Date of Injury/Onset:  3 WEEKS \par Pain:    At Rest: 5-6/10 \par With Activity:  6/10 \par Mechanism of injury: NONE\par This is NOT a Work Related Injury being treated under Worker's Compensation.\par This is NOT an athletic injury occurring associated with an interscholastic or organized sports team.\par Quality of symptoms: SHARP, ACHING \par Improves with: REST\par Worse with: LIFTING, REACHING \par Prior treatment: CSI - 01/2022\par Prior Imaging: NONE\par Out of work/sport: _, since _\par School/Sport/Position/Occupation:_\par Additional Information: None\par

## 2022-04-29 ENCOUNTER — APPOINTMENT (OUTPATIENT)
Dept: FAMILY MEDICINE | Facility: CLINIC | Age: 87
End: 2022-04-29
Payer: MEDICARE

## 2022-04-29 VITALS
WEIGHT: 158 LBS | HEART RATE: 70 BPM | TEMPERATURE: 96.6 F | BODY MASS INDEX: 25.5 KG/M2 | OXYGEN SATURATION: 97 % | SYSTOLIC BLOOD PRESSURE: 140 MMHG | DIASTOLIC BLOOD PRESSURE: 70 MMHG

## 2022-04-29 VITALS — SYSTOLIC BLOOD PRESSURE: 130 MMHG | DIASTOLIC BLOOD PRESSURE: 70 MMHG

## 2022-04-29 PROCEDURE — 99214 OFFICE O/P EST MOD 30 MIN: CPT | Mod: 25

## 2022-04-29 PROCEDURE — 36415 COLL VENOUS BLD VENIPUNCTURE: CPT

## 2022-04-29 NOTE — HISTORY OF PRESENT ILLNESS
[FreeTextEntry1] : patient here for a follow up [de-identified] : no chest pain, no sob, no cough, no fever, no dizziness, no abdominal pain, no n/v/d/c/melena/brbpr/hematuria/dysuria\par states has been watching diet low carb

## 2022-04-29 NOTE — PHYSICAL EXAM
[No Acute Distress] : no acute distress [Well Nourished] : well nourished [Well-Appearing] : well-appearing [Normal Voice/Communication] : normal voice/communication [Normal Sclera/Conjunctiva] : normal sclera/conjunctiva [Normal Outer Ear/Nose] : the outer ears and nose were normal in appearance [No JVD] : no jugular venous distention [No Respiratory Distress] : no respiratory distress  [No Accessory Muscle Use] : no accessory muscle use [Clear to Auscultation] : lungs were clear to auscultation bilaterally [Normal Rate] : normal rate  [Regular Rhythm] : with a regular rhythm [Normal S1, S2] : normal S1 and S2 [No Edema] : there was no peripheral edema [Soft] : abdomen soft [Non Tender] : non-tender [Non-distended] : non-distended [No Spinal Tenderness] : no spinal tenderness [Grossly Normal Strength/Tone] : grossly normal strength/tone [No Rash] : no rash [Coordination Grossly Intact] : coordination grossly intact [No Focal Deficits] : no focal deficits [Normal Gait] : normal gait [Speech Grossly Normal] : speech grossly normal [Normal Affect] : the affect was normal [Alert and Oriented x3] : oriented to person, place, and time [Normal Mood] : the mood was normal [Normal Insight/Judgement] : insight and judgment were intact

## 2022-05-03 LAB
25(OH)D3 SERPL-MCNC: 39.5 NG/ML
ALBUMIN SERPL ELPH-MCNC: 4.6 G/DL
ALP BLD-CCNC: 89 U/L
ALT SERPL-CCNC: 11 U/L
ANION GAP SERPL CALC-SCNC: 14 MMOL/L
AST SERPL-CCNC: 16 U/L
BASOPHILS # BLD AUTO: 0.03 K/UL
BASOPHILS NFR BLD AUTO: 0.3 %
BILIRUB SERPL-MCNC: 0.4 MG/DL
BUN SERPL-MCNC: 13 MG/DL
CALCIUM SERPL-MCNC: 10.6 MG/DL
CHLORIDE SERPL-SCNC: 98 MMOL/L
CHOLEST SERPL-MCNC: 163 MG/DL
CO2 SERPL-SCNC: 25 MMOL/L
COVID-19 NUCLEOCAPSID  GAM ANTIBODY INTERPRETATION: NEGATIVE
COVID-19 SPIKE DOMAIN ANTIBODY INTERPRETATION: POSITIVE
CREAT SERPL-MCNC: 0.69 MG/DL
EGFR: 84 ML/MIN/1.73M2
EOSINOPHIL # BLD AUTO: 0 K/UL
EOSINOPHIL NFR BLD AUTO: 0 %
ESTIMATED AVERAGE GLUCOSE: 134 MG/DL
GLUCOSE SERPL-MCNC: 131 MG/DL
HBA1C MFR BLD HPLC: 6.3 %
HCT VFR BLD CALC: 43.6 %
HDLC SERPL-MCNC: 76 MG/DL
HGB BLD-MCNC: 13.4 G/DL
IMM GRANULOCYTES NFR BLD AUTO: 0.4 %
LDLC SERPL CALC-MCNC: 67 MG/DL
LYMPHOCYTES # BLD AUTO: 0.91 K/UL
LYMPHOCYTES NFR BLD AUTO: 9.6 %
MAN DIFF?: NORMAL
MCHC RBC-ENTMCNC: 29.9 PG
MCHC RBC-ENTMCNC: 30.7 GM/DL
MCV RBC AUTO: 97.3 FL
MONOCYTES # BLD AUTO: 0.38 K/UL
MONOCYTES NFR BLD AUTO: 4 %
NEUTROPHILS # BLD AUTO: 8.14 K/UL
NEUTROPHILS NFR BLD AUTO: 85.7 %
NONHDLC SERPL-MCNC: 87 MG/DL
PLATELET # BLD AUTO: 400 K/UL
POTASSIUM SERPL-SCNC: 4.6 MMOL/L
PROT SERPL-MCNC: 7.1 G/DL
RBC # BLD: 4.48 M/UL
RBC # FLD: 14.1 %
SARS-COV-2 AB SERPL IA-ACNC: >250 U/ML
SARS-COV-2 AB SERPL QL IA: 0.08 INDEX
SODIUM SERPL-SCNC: 138 MMOL/L
TRIGL SERPL-MCNC: 97 MG/DL
TSH SERPL-ACNC: 1.88 UIU/ML
WBC # FLD AUTO: 9.5 K/UL

## 2022-05-13 ENCOUNTER — APPOINTMENT (OUTPATIENT)
Dept: ORTHOPEDIC SURGERY | Facility: CLINIC | Age: 87
End: 2022-05-13
Payer: MEDICARE

## 2022-05-13 VITALS — HEIGHT: 66 IN | WEIGHT: 158 LBS | BODY MASS INDEX: 25.39 KG/M2

## 2022-05-13 PROCEDURE — 72110 X-RAY EXAM L-2 SPINE 4/>VWS: CPT

## 2022-05-13 PROCEDURE — 72070 X-RAY EXAM THORAC SPINE 2VWS: CPT

## 2022-05-13 PROCEDURE — 99203 OFFICE O/P NEW LOW 30 MIN: CPT

## 2022-05-13 NOTE — PHYSICAL EXAM
[Outside films reviewed] : Outside films reviewed [de-identified] : Constitutional:\par -  General Appearance: \par Unremarkable\par Body Habitus\par Well Developed \par Well Nourished\par Body Habitus\par No Deformities\par Well Groomed\par \par Ability To communicate:\par Normal\par \par Neurologic: \par Global sensation is intact to upper and lower extremities.  See examination of Neck and/or Spine for exceptions.\par Orientation to Time, Place and Person is: Normal\par Mood And Affect is Normal\par \par Skin:\par -  Head/Face, Right Upper/Lower Extremity, Left Upper/Lower Extremity: Normal\par See Examination of Neck and/or Spine for exceptions\par \par Cardiovascular:\par Peripheral Cardiovascular System is Normal\par \par Palpation of Lymph Nodes:\par Normal Palpation of lymph nodes in: Axilla, Cervical, Inguinal\par Abnormal Palpation of lymph nodes in: None  [] : non-antalgic [FreeTextEntry3] : left shoulder sits higher than right [de-identified] : Neurological Testing for the Lumbar spine is as follows:\par - Light Touch is intact throughout both Lower extremities\par - Achilles and patella reflexes are symmetrical\par - No sustained clonus at ankles\par - Negative Babinski test bilaterally\par - Sensory exam is non-focal throughout both lower extremities  [FreeTextEntry1] : thoracic Spine X-Ray (05/13/22) - [facility]-HOUSE [facility]: \par On my interpretation of the images\par 1) multi-level disc degeneration

## 2022-05-13 NOTE — ASSESSMENT
[FreeTextEntry1] : 88 y/o female with lumbar radiculopathy and degenerative scoliosis.  Refractory to a month of chiro care and NSAIDs. I am requesting a lumbar spine non-contrast MRI to evaluate for stenosis. I would like to follow up with the patient in 2-3 weeks to evaluate and review the results of her MRI> \par \par Prior to appointment and during encounter with patient extensive medical records were reviewed including but not limited to, hospital records, out patient records, imaging results, and lab data. During this appointment the patient was examined, diagnoses were discussed and explained in a face to face manner. In addition extensive time was spent reviewing aforementioned diagnostic studies. Counseling including abnormal image results, differential diagnoses, treatment options, risk and benefits, lifestyle changes, current condition, and current medications was performed. Patient's comments, questions, and concerns were address and patient verbalized understanding. Based on this patient's presentation at our office, which is an orthopedic spine surgeon's office, this patient inherently / intrinsically has a risk, however minute, of developing issues such as Cauda equina syndrome, bowel and bladder changes, or progression of motor or neurological deficits such as paralysis which may be permanent.\par \par I, Celio Griffith, attest that this documentation has been prepared under the direction and in the presence of provider Robin Torres MD.

## 2022-05-13 NOTE — HISTORY OF PRESENT ILLNESS
[de-identified] : 86 y/o female presents for her initial consultation. Patient c/o lower back pain radiating into her left lower extremity. Patient reports that prolonged sitting exacerbates her pain. Patient reports a 1 month history of this pain. Patient reports that she has underwent chiropractic care which she did not find helpful. \par \par *Patient is hard of hearing, does not have hearing aids.

## 2022-05-22 ENCOUNTER — FORM ENCOUNTER (OUTPATIENT)
Age: 87
End: 2022-05-22

## 2022-05-23 ENCOUNTER — APPOINTMENT (OUTPATIENT)
Dept: MRI IMAGING | Facility: CLINIC | Age: 87
End: 2022-05-23
Payer: MEDICARE

## 2022-05-23 PROCEDURE — 72148 MRI LUMBAR SPINE W/O DYE: CPT | Mod: MH

## 2022-05-27 ENCOUNTER — APPOINTMENT (OUTPATIENT)
Dept: ORTHOPEDIC SURGERY | Facility: CLINIC | Age: 87
End: 2022-05-27
Payer: MEDICARE

## 2022-05-27 VITALS — BODY MASS INDEX: 25.39 KG/M2 | HEIGHT: 66 IN | WEIGHT: 158 LBS

## 2022-05-27 PROCEDURE — 99214 OFFICE O/P EST MOD 30 MIN: CPT

## 2022-05-27 NOTE — ASSESSMENT
[FreeTextEntry1] : 88 y/o female with lumbar degenerative scoliosis that results in Left L4/5, 2/3, 3/4 lateral recess stenosis.  Causing a combination of radicular and claudicatory symptoms. I am referring the patient to pain management to discuss trying left L4-5 TFESI vs left L2-3 TFESI. I am prescribing the patient Gabapentin, I instructed the patient to start 100mg 1 tab a day for 2 days, then start 100mg 1 tab, BID for two days, until day 5, where she should start 100mg 1 tab, TID until she follows up with pain management. Titration schedule provided. Patient was provided with a referral for lumbar physical therapy to work on stretching, strengthening and range of motion. Patient was provided with a lumbar home exercise program. I would like to follow up with the patient on an as needed basis moving forward. \par \par Prior to appointment and during encounter with patient extensive medical records were reviewed including but not limited to, hospital records, out patient records, imaging results, and lab data. During this appointment the patient was examined, diagnoses were discussed and explained in a face to face manner. In addition extensive time was spent reviewing aforementioned diagnostic studies. Counseling including abnormal image results, differential diagnoses, treatment options, risk and benefits, lifestyle changes, current condition, and current medications was performed. Patient's comments, questions, and concerns were address and patient verbalized understanding. Based on this patient's presentation at our office, which is an orthopedic spine surgeon's office, this patient inherently / intrinsically has a risk, however minute, of developing issues such as Cauda equina syndrome, bowel and bladder changes, or progression of motor or neurological deficits such as paralysis which may be permanent.\par \par I, Celio Griffith, attest that this documentation has been prepared under the direction and in the presence of provider Robin Torres MD.

## 2022-05-27 NOTE — PHYSICAL EXAM
[de-identified] : Constitutional:\par -  General Appearance: \par Unremarkable\par Body Habitus\par Well Developed \par Well Nourished\par Body Habitus\par No Deformities\par Well Groomed\par \par Ability To communicate:\par Normal\par \par Neurologic: \par Global sensation is intact to upper and lower extremities.  See examination of Neck and/or Spine for exceptions.\par Orientation to Time, Place and Person is: Normal\par Mood And Affect is Normal\par \par Skin:\par -  Head/Face, Right Upper/Lower Extremity, Left Upper/Lower Extremity: Normal\par See Examination of Neck and/or Spine for exceptions\par \par Cardiovascular:\par Peripheral Cardiovascular System is Normal\par \par Palpation of Lymph Nodes:\par Normal Palpation of lymph nodes in: Axilla, Cervical, Inguinal\par Abnormal Palpation of lymph nodes in: None

## 2022-05-27 NOTE — HISTORY OF PRESENT ILLNESS
[5] : 5 [de-identified] : 88 y/o female presents for a follow up evaluation and review of her lumbar spine non-contrast MRI results. Patient c/o lower back pain radiating into her left lateral thigh, left calf and left foot and ankle.  [de-identified] : MRI [facility]

## 2022-05-27 NOTE — DATA REVIEWED
[MRI] : MRI [Lumbar Spine] : lumbar spine [I independently reviewed and interpreted images and report] : I independently reviewed and interpreted images and report [FreeTextEntry1] : Lumbar spine MRI taken (05/23/22) - [facility] \par On my interpretation of the images\par L5-S1 advanced right facet arthritis, mild to moderate right lateral recess stenosis,  \par L4-5 advanced disc degeneration with modic changes, moderate facet degeneration bilateral, moderate ot severe bilateral lateral recess stenosis \par L3-4 mild DDD, left sided foraminal protrusion, moderate left lateral recess stenosis, mild overall stenosis \par L2-3 mild to moderate DDD, moderate facet degeneration, moderate left lateral recess and foraminal stenosis \par L1-2 mild to moderate DDD, mild overall stenosis \par T12-L1 Anterior osteophyte\par T11-12 anterior osteophyte without stenosis \par **20 degree scoliotic curvature from T11-L4

## 2022-05-27 NOTE — REVIEW OF SYSTEMS
[Joint Pain] : joint pain [Joint Stiffness] : joint stiffness [Negative] : Heme/Lymph [FreeTextEntry9] : lumbar

## 2022-06-02 ENCOUNTER — APPOINTMENT (OUTPATIENT)
Dept: PAIN MANAGEMENT | Facility: CLINIC | Age: 87
End: 2022-06-02
Payer: MEDICARE

## 2022-06-02 VITALS — WEIGHT: 158 LBS | BODY MASS INDEX: 25.39 KG/M2 | HEIGHT: 66 IN

## 2022-06-02 PROCEDURE — 99204 OFFICE O/P NEW MOD 45 MIN: CPT

## 2022-06-03 NOTE — PHYSICAL EXAM
[Left] : left hip [] : positive JONATHAN test [de-identified] : Constitutional: \par - No acute distress \par - Well developed; well nourished \par \par Neurological: \par - normal mood and affect \par - alert and oriented x 3  \par \par Cardiovascular: \par - grossly normal\par \par Lumbar Spine Exam: \par \par Inspection: \par erythema (-) \par ecchymosis (-) \par rashes (-) \par alignment: lumbar scoliosis\par \par Palpation:  \par paraspinal tenderness:                  L (-) ; R (-) \par thoracic paraspinal tenderness:    L (-) ; R (-) \par sciatic nerve tenderness :             L (+) ; R (-) \par SI joint tenderness:                        L (-) ; R (-) \par GTB tenderness:                            L (-);  R (-) \par \par ROM:   \par Full ROM with mild stiffness \par \par Strength:                   Right       Left    \par Hip Flexion:                (5/5)       (5/5) \par Quadriceps:               (5/5)       (5/5) \par Hamstrings:                (5/5)       (5/5) \par Ankle Dorsiflexion:     (5/5)       (5/5) \par EHL:                            (5/5)       (5/5) \par Ankle Plantarflexion:  (5/5)       (5/5) \par \par \par Special Tests: \par SLR:                      R (-) ; L (-) \par Facet loading:       R (-) ; L (-) \par JONATHAN test:          R (-) ; L (-) \par XSLR:                   R (-) ; L (-) \par Lavell's test:        R (-) ; L(-) \par Tight Hamstrings   R (-) ; L (-) \par \par Neurologic: \par Light touch intact throughout LE \par Reflexes normal and symmetric \par \par Gait: \par antalgic gait \par ambulates without assistive device

## 2022-06-03 NOTE — ASSESSMENT
[FreeTextEntry1] : A discussion regarding available pain management treatment options occurred with the patient.  These included interventional, rehabilitative, pharmacological, and alternative modalities. We will proceed with the following: \par \par Interventional treatment options: \par - Proceed with left PM L4-5 LESI  with fluoroscopic guidance \par - May remain on aspirin for iniudcated procedure \par - See additional instructions below\par \par Rehabilitative options: \par - initiate trial of physical therapy \par - participation in active HEP was discussed \par \par Medication based treatment options: \par - continue OTC NSAIDs PRN\par - Patient prefers to avoid rx based therapies\par - See additional instructions below\par \par Complementary treatment options: \par - Failed chiropractic trial \par - lifestyle modifications discussed \par - See additional instructions below \par \par Additional treatment recommendations as follows: \par - Follow up 1-2 weeks post injection for assessment of efficacy and further recommendations.\par \par The risks, benefits and alternatives of the proposed procedure were explained in detail with the patient. The risks outlined include but are not limited to infection, bleeding, post- dural puncture headache, nerve injury, a temporary increase in pain, failure to resolve symptoms, allergic reaction, and possible elevation of blood sugar in diabetics if using corticosteroid.  All questions were answered to patient's apparent satisfaction and he/she verbalized an understanding.\par \par The documentation recorded by the scribe, in my presence, accurately reflects the service I personally performed and the decisions made by me with my edits as appropriate.\par \par I, Moisés Campuzano acting as scribe, attest that this documentation has been prepared under the direction and in the presence of Provider Velasquez Barbosa DO.

## 2022-06-03 NOTE — REASON FOR VISIT
[Initial Consultation] : an initial pain management consultation [FreeTextEntry2] : bilateral legs pain

## 2022-06-03 NOTE — HISTORY OF PRESENT ILLNESS
[Left Arm] : left arm [Right Arm] : right arm [Sudden] : sudden [5] : 5 [Radiating] : radiating [Shooting] : shooting [Intermittent] : intermittent [Leisure] : leisure [Social interactions] : social interactions [Meds] : meds [Walking] : walking [Bending forward] : bending forward [Extending back] : extending back [FreeTextEntry1] : The patient presents for initial evaluation regarding their low back pain.  Patient was referred by Dr. Torres. Patient c/o pain to the left lower extremity pain radiating to the groin and inferiorly to the left ankle. She reports increase of pain with prolonged sitting. She reports improvement of symptoms while walking. Patient reports onset of symptoms x 2 months ago. Patient denies any in sighting event to the affect area. Patient reports benefit with previous chiropractic trials. Patient reports having pain to the right leg in the past however symptoms resolved with chiropractic trial. Patient reports utilizing OTC Advil PRN with minimal to no benefit. Patient has not tried PT directed towards the lumbar spine. \par  \par Subjective weakness: No \par Lower extremity paresthesias: Yes\par Bladder/bowel dysfunction: No \par  \par Injections: No \par  \par Pertinent Surgical History: N/A \par  \par Imaging: \par MRI Lumbar Spine (05/23/22) - [facility]\par \par L1-L2: There is disc bulging, bony ridging, facet hypertrophy and moderate left greater than right neural \par foraminal narrowing.\par L2-L3: There is disc bulging, bony ridging, facet hypertrophy, left paracentral/foraminal herniation, left L3 \par nerve root impingement, and left exiting L2 nerve root impingement with asymmetric left foraminal narrowing \par and mild central stenosis asymmetric on the left.\par L3-L4: There is disc bulging, bony ridging, facet arthrosis, left paracentral/foraminal herniation and \par impingement upon left L4 and left exiting L3 nerve roots with asymmetric left foraminal narrowing.\par L4-L5: There is disc bulging, bony ridging, facet arthrosis and broad-based posterior disc herniation asymmetric \par on the right with mild central stenosis and right exiting L4 nerve root impingement.\par L5-S1: There is anterolisthesis, disc bulging, bony ridging, and facet arthrosis with right greater than left \par foraminal narrowing.\par  \par Physician Disclaimer: I have personally reviewed and confirmed all HPI data with the patient.  [] : no [FreeTextEntry7] : bilateral legs [de-identified] : lumbar mri at [facility]

## 2022-06-07 NOTE — H&P PST ADULT - PRIMARY CARE PROVIDER
Patient calling in wants to make sure she can still have injection into her ankle on 6/13. Patients pcp has put her on two new medications that started in May, Eliquis and Metoprolol. Please call patient to advice. Patient states that if she doesn't answer to please leave a detailed message as to if it ok or not, [phone]   Dr. John Reyes   [phone]

## 2022-06-10 ENCOUNTER — APPOINTMENT (OUTPATIENT)
Dept: PAIN MANAGEMENT | Facility: CLINIC | Age: 87
End: 2022-06-10
Payer: MEDICARE

## 2022-06-10 ENCOUNTER — APPOINTMENT (OUTPATIENT)
Dept: PAIN MANAGEMENT | Facility: CLINIC | Age: 87
End: 2022-06-10

## 2022-06-10 PROCEDURE — 62323 NJX INTERLAMINAR LMBR/SAC: CPT

## 2022-06-10 NOTE — PROCEDURE
[FreeTextEntry3] : Date of Service: 06/10/2022 \par \par Account: [id number]\par \par Patient: NEPTALI CURRAN \par \par YOB: 1935\par \par Age: 87 year\par \par Surgeon:      Velasquez Barbosa DO\par \par Assistant:    None\par \par Pre-Operative Diagnosis:         Lumbosacral Radiculitis (M54.17)\par \par Post Operative Diagnosis:       Lumbosacral Radiculitis (M54.17)   \par \par Procedure:             Left parmedian (L4-5) epidural steroid injection under fluoroscopic guidance\par \par Anesthesia:           MAC\par \par This procedure was carried out using fluoroscopic guidance.  The risks and benefits of the procedure were discussed extensively with the patient.  The consent of the patient was obtained and the following procedure was performed.  A timeout was performed with all essential staff present and the site and side were verified.\par \par The patient was placed in the prone position with a pillow under the abdomen to minimize the lumbar lordosis.  The lumbar area was prepped and draped in a sterile fashion.  Under A/P view with slight cephalad-caudad angulation, the L4-5 interspace was identified and marked.  Using sterile technique the superficial skin was anesthetized with 1% Lidocaine.  A 20 gauge Tuohy needle was advanced into the epidural space under fluoroscopy using loss of resistance at the L4-5 level.  After negative aspiration for heme or CSF 5 cc of of a mixture of preservative free normal saline and 40 mg of Kenalog were injected into the epidural space. \par \par The needle was subsequently removed.  Vital signs remained normal.  Pulse oximeter was used throughout the procedure and the patient's pulse and oxygen saturation remained within normal limits.  The patient tolerated the procedure well.  There were no complications.  The patient was instructed to apply ice over the injection sites for twenty minutes every two hours for the next 24 to 48 hours.\par \par Disposition:\par      1. The patient was advised to F/U in 1-2 weeks to assess the response to the injection.\par      2. The patient was also instructed to contact me immediately if there were any concerns related to the procedure performed.\par \par In consideration of the current national shortage of contrast agents and after careful assessment of the R/B/A the decision was made to proceed with the above indicated procedure without the use of such.  This is in accord with current published pain societal guidelines for management of interventional pain procedures during this national shortage.

## 2022-06-13 ENCOUNTER — APPOINTMENT (OUTPATIENT)
Dept: FAMILY MEDICINE | Facility: CLINIC | Age: 87
End: 2022-06-13
Payer: MEDICARE

## 2022-06-13 VITALS
BODY MASS INDEX: 25.39 KG/M2 | HEART RATE: 57 BPM | HEIGHT: 66 IN | TEMPERATURE: 98.7 F | SYSTOLIC BLOOD PRESSURE: 148 MMHG | OXYGEN SATURATION: 98 % | DIASTOLIC BLOOD PRESSURE: 60 MMHG | WEIGHT: 158 LBS

## 2022-06-13 DIAGNOSIS — E83.52 HYPERCALCEMIA: ICD-10-CM

## 2022-06-13 PROCEDURE — 36415 COLL VENOUS BLD VENIPUNCTURE: CPT

## 2022-06-13 PROCEDURE — 99214 OFFICE O/P EST MOD 30 MIN: CPT | Mod: 25

## 2022-06-13 RX ORDER — OMEPRAZOLE 40 MG/1
40 CAPSULE, DELAYED RELEASE ORAL
Qty: 90 | Refills: 1 | Status: DISCONTINUED | COMMUNITY
Start: 2020-12-11 | End: 2022-06-13

## 2022-06-13 NOTE — HISTORY OF PRESENT ILLNESS
[FreeTextEntry1] : patient is here to repeat labs [de-identified] : no chest pain, no sob, no cough, no fever, no dizziness, no abdominal pain, no n/v/d/c/melena/brbpr/hematuria/dysuria\par

## 2022-06-16 ENCOUNTER — NON-APPOINTMENT (OUTPATIENT)
Age: 87
End: 2022-06-16

## 2022-06-16 LAB
ALBUMIN SERPL ELPH-MCNC: 4.2 G/DL
ALP BLD-CCNC: 75 U/L
ALT SERPL-CCNC: 21 U/L
ANION GAP SERPL CALC-SCNC: 15 MMOL/L
APPEARANCE: CLEAR
AST SERPL-CCNC: 19 U/L
BACTERIA: NEGATIVE
BILIRUB SERPL-MCNC: 0.5 MG/DL
BILIRUBIN URINE: NEGATIVE
BLOOD URINE: NEGATIVE
BUN SERPL-MCNC: 15 MG/DL
CALCIUM SERPL-MCNC: 10.3 MG/DL
CALCIUM SERPL-MCNC: 10.3 MG/DL
CHLORIDE SERPL-SCNC: 96 MMOL/L
CO2 SERPL-SCNC: 24 MMOL/L
COLOR: NORMAL
CREAT SERPL-MCNC: 0.68 MG/DL
EGFR: 85 ML/MIN/1.73M2
GLUCOSE QUALITATIVE U: NEGATIVE
GLUCOSE SERPL-MCNC: 119 MG/DL
HYALINE CASTS: 1 /LPF
KETONES URINE: NEGATIVE
LEUKOCYTE ESTERASE URINE: ABNORMAL
MICROSCOPIC-UA: NORMAL
NITRITE URINE: NEGATIVE
PARATHYROID HORMONE INTACT: 36 PG/ML
PH URINE: 6
POTASSIUM SERPL-SCNC: 4.3 MMOL/L
PROT SERPL-MCNC: 6.5 G/DL
PROTEIN URINE: NEGATIVE
RED BLOOD CELLS URINE: 1 /HPF
SODIUM SERPL-SCNC: 135 MMOL/L
SPECIFIC GRAVITY URINE: 1.02
SQUAMOUS EPITHELIAL CELLS: 1 /HPF
UROBILINOGEN URINE: NORMAL
WHITE BLOOD CELLS URINE: 2 /HPF

## 2022-06-30 ENCOUNTER — APPOINTMENT (OUTPATIENT)
Dept: PAIN MANAGEMENT | Facility: CLINIC | Age: 87
End: 2022-06-30

## 2022-06-30 VITALS — HEIGHT: 66 IN | WEIGHT: 158 LBS | BODY MASS INDEX: 25.39 KG/M2

## 2022-06-30 PROCEDURE — 99214 OFFICE O/P EST MOD 30 MIN: CPT

## 2022-07-01 NOTE — HISTORY OF PRESENT ILLNESS
[Left Arm] : left arm [Right Arm] : right arm [Sudden] : sudden [5] : 5 [Radiating] : radiating [Shooting] : shooting [Intermittent] : intermittent [Leisure] : leisure [Social interactions] : social interactions [Meds] : meds [Walking] : walking [Bending forward] : bending forward [Extending back] : extending back [Lower back] : lower back [FreeTextEntry1] : 06/30/22 - Patient presents for a FUV following an L4-5 LESI on 06/10/22. Patient reports >80% reduction of leg pain following the injection. Patient reports residual stiffness to the left knee which subsides after 10 minutes. She reports increase functionality. Able to sit and walk for further distances. Patient is pleased with her response to the injection. \par \par 06/02/22 - The patient presents for initial evaluation regarding their low back pain.  Patient was referred by Dr. Torres. Patient c/o pain to the left lower extremity pain radiating to the groin and inferiorly to the left ankle. She reports increase of pain with prolonged sitting. She reports improvement of symptoms while walking. Patient reports onset of symptoms x 2 months ago. Patient denies any in sighting event to the affect area. Patient reports benefit with previous chiropractic trials. Patient reports having pain to the right leg in the past however symptoms resolved with chiropractic trial. Patient reports utilizing OTC Advil PRN with minimal to no benefit. Patient has not tried PT directed towards the lumbar spine. \par  \par Injections: \par 1) L4-5 LESI (06/10/22)  \par  \par Pertinent Surgical History: N/A \par  \par Imaging: \par MRI Lumbar Spine (05/23/22) - [facility]\par \par L1-L2: There is disc bulging, bony ridging, facet hypertrophy and moderate left greater than right neural \par foraminal narrowing.\par L2-L3: There is disc bulging, bony ridging, facet hypertrophy, left paracentral/foraminal herniation, left L3 \par nerve root impingement, and left exiting L2 nerve root impingement with asymmetric left foraminal narrowing \par and mild central stenosis asymmetric on the left.\par L3-L4: There is disc bulging, bony ridging, facet arthrosis, left paracentral/foraminal herniation and \par impingement upon left L4 and left exiting L3 nerve roots with asymmetric left foraminal narrowing.\par L4-L5: There is disc bulging, bony ridging, facet arthrosis and broad-based posterior disc herniation asymmetric \par on the right with mild central stenosis and right exiting L4 nerve root impingement.\par L5-S1: There is anterolisthesis, disc bulging, bony ridging, and facet arthrosis with right greater than left \par foraminal narrowing.\par  \par Physician Disclaimer: I have personally reviewed and confirmed all HPI data with the patient.  [] : no [FreeTextEntry7] : bilateral legs [de-identified] : lumbar mri at [facility]

## 2022-07-01 NOTE — REASON FOR VISIT
[Follow-Up Visit] : a follow-up pain management visit [FreeTextEntry2] : bilateral legs pain and follow up to injection

## 2022-07-01 NOTE — PHYSICAL EXAM
[Left] : left hip [] : positive JONATHAN test [de-identified] : Constitutional: \par - No acute distress \par - Well developed; well nourished \par \par Neurological: \par - normal mood and affect \par - alert and oriented x 3  \par \par Cardiovascular: \par - grossly normal\par \par Lumbar Spine Exam: \par \par Inspection: \par erythema (-) \par ecchymosis (-) \par rashes (-) \par alignment: lumbar scoliosis\par \par Palpation:  \par paraspinal tenderness:                  L (-) ; R (-) \par thoracic paraspinal tenderness:    L (-) ; R (-) \par sciatic nerve tenderness :             L (-) ; R (-) \par SI joint tenderness:                        L (-) ; R (-) \par GTB tenderness:                            L (-);  R (-) \par \par ROM:   \par Full ROM with mild stiffness \par \par Strength:                   Right       Left    \par Hip Flexion:                (5/5)       (5/5) \par Quadriceps:               (5/5)       (5/5) \par Hamstrings:                (5/5)       (5/5) \par Ankle Dorsiflexion:     (5/5)       (5/5) \par EHL:                            (5/5)       (5/5) \par Ankle Plantarflexion:  (5/5)       (5/5) \par \par \par Special Tests: \par SLR:                      R (-) ; L (-) \par Facet loading:       R (-) ; L (-) \par JONATHAN test:          R (-) ; L (-) \par XSLR:                   R (-) ; L (-) \par Lavell's test:        R (-) ; L(-) \par Tight Hamstrings   R (-) ; L (-) \par \par Neurologic: \par Light touch intact throughout LE \par Reflexes normal and symmetric \par \par Gait: \par antalgic gait \par ambulates without assistive device

## 2022-07-01 NOTE — ASSESSMENT
[FreeTextEntry1] : A discussion regarding available pain management treatment options occurred with the patient.  These included interventional, rehabilitative, pharmacological, and alternative modalities. We will proceed with the following: \par \par Interventional treatment options: \par - would repeat left PM L4-5 LESI  with fluoroscopic guidance (80mg Kenalog) - will call \par - May remain on aspirin for indicated procedure \par - See additional instructions below\par \par Rehabilitative options: \par - encouraged participation in HEP\par - home exercise sheet provided \par \par Medication based treatment options: \par - continue OTC NSAIDs PRN\par - Patient prefers to avoid rx based therapies\par - See additional instructions below\par \par Complementary treatment options: \par - Failed chiropractic trial \par - lifestyle modifications discussed \par - See additional instructions below \par \par Additional treatment recommendations as follows: \par - F/U for repeat injection or PRN \par - Follow up 1-2 weeks post injection for assessment of efficacy and further recommendations.\par \par The risks, benefits and alternatives of the proposed procedure were explained in detail with the patient. The risks outlined include but are not limited to infection, bleeding, post- dural puncture headache, nerve injury, a temporary increase in pain, failure to resolve symptoms, allergic reaction, and possible elevation of blood sugar in diabetics if using corticosteroid.  All questions were answered to patient's apparent satisfaction and he/she verbalized an understanding.\par \par The documentation recorded by the scribe, in my presence, accurately reflects the service I personally performed and the decisions made by me with my edits as appropriate.\par \par I, Moisés Campuzano acting as scribe, attest that this documentation has been prepared under the direction and in the presence of Provider Velasquez Barbosa DO.

## 2022-07-13 ENCOUNTER — APPOINTMENT (OUTPATIENT)
Dept: FAMILY MEDICINE | Facility: CLINIC | Age: 87
End: 2022-07-13

## 2022-07-13 VITALS
DIASTOLIC BLOOD PRESSURE: 76 MMHG | OXYGEN SATURATION: 97 % | HEART RATE: 74 BPM | SYSTOLIC BLOOD PRESSURE: 138 MMHG | BODY MASS INDEX: 21.38 KG/M2 | TEMPERATURE: 97.6 F | HEIGHT: 66 IN | WEIGHT: 133 LBS

## 2022-07-13 PROCEDURE — 99214 OFFICE O/P EST MOD 30 MIN: CPT

## 2022-07-13 NOTE — HISTORY OF PRESENT ILLNESS
[FreeTextEntry1] : blood pressure check up  [de-identified] : no chest pain, no sob, no cough, no fever, no dizziness, no abdominal pain, no n/v/d/c/melena/brbpr/hematuria/dysuria\par states going for another epidural and dental procedure \par admits to eating less carbs and substituting lunch with a boost drink noticed weight dropping

## 2022-07-13 NOTE — ASSESSMENT
[FreeTextEntry1] : follow up in 1.5 months for labs and weight check \par discussed weight - advised not skip meals - should have a meal for lunch and snack inbetween healthy \par if continue to drop rto asap

## 2022-07-27 ENCOUNTER — APPOINTMENT (OUTPATIENT)
Dept: PAIN MANAGEMENT | Facility: CLINIC | Age: 87
End: 2022-07-27

## 2022-07-27 PROCEDURE — 62323 NJX INTERLAMINAR LMBR/SAC: CPT

## 2022-07-27 NOTE — PROCEDURE
[FreeTextEntry3] : Date of Service: 07/27/2022 \par \par Account: [id number]\par \par Patient: NEPTALI CURRAN \par \par YOB: 1935\par \par Age: 87 year\par \par Surgeon:      Velasquez Barbosa DO\par \par Assistant:    None\par \par Pre-Operative Diagnosis:         Lumbosacral Radiculitis (M54.17)\par \par Post Operative Diagnosis:       Lumbosacral Radiculitis (M54.17)   \par \par Procedure:             Left paramedian (L4-5) epidural steroid injection under fluoroscopic guidance\par \par Anesthesia:           MAC\par \par This procedure was carried out using fluoroscopic guidance.  The risks and benefits of the procedure were discussed extensively with the patient.  The consent of the patient was obtained and the following procedure was performed.  A timeout was performed with all essential staff present and the site and side were verified.\par \par The patient was placed in the prone position with a pillow under the abdomen to minimize the lumbar lordosis.  The lumbar area was prepped and draped in a sterile fashion.  Under A/P view with slight cephalad-caudad angulation, the L4-5 interspace was identified and marked.  Using sterile technique the superficial skin was anesthetized with 1% Lidocaine.  A 20 gauge Tuohy needle was advanced into the epidural space under fluoroscopy using loss of resistance at the L4-5 level.  After negative aspiration for heme or CSF, an epidurogram was obtained in the A/P and lateral fluoroscopic views using 2-3 cc of Omnipaque contrast confirming epidural placement of the needle.  After this, 5 cc of of a mixture of preservative free normal saline and 80 mg of Kenalog were injected into the epidural space. \par \par The needle was subsequently removed.  Vital signs remained normal.  Pulse oximeter was used throughout the procedure and the patient's pulse and oxygen saturation remained within normal limits.  The patient tolerated the procedure well.  There were no complications.  The patient was instructed to apply ice over the injection sites for twenty minutes every two hours for the next 24 to 48 hours.\par \par Disposition:\par      1. The patient was advised to F/U in 1-2 weeks to assess the response to the injection.\par      2. The patient was also instructed to contact me immediately if there were any concerns related to the procedure performed.

## 2022-07-29 ENCOUNTER — NON-APPOINTMENT (OUTPATIENT)
Age: 87
End: 2022-07-29

## 2022-08-31 ENCOUNTER — RX RENEWAL (OUTPATIENT)
Age: 87
End: 2022-08-31

## 2022-09-01 ENCOUNTER — APPOINTMENT (OUTPATIENT)
Dept: PAIN MANAGEMENT | Facility: CLINIC | Age: 87
End: 2022-09-01

## 2022-09-01 VITALS — HEIGHT: 66 IN | WEIGHT: 133 LBS | BODY MASS INDEX: 21.38 KG/M2

## 2022-09-01 DIAGNOSIS — M41.80 OTHER FORMS OF SCOLIOSIS, SITE UNSPECIFIED: ICD-10-CM

## 2022-09-01 PROCEDURE — 99213 OFFICE O/P EST LOW 20 MIN: CPT

## 2022-09-02 NOTE — HISTORY OF PRESENT ILLNESS
[Lower back] : lower back [Left Arm] : left arm [Right Arm] : right arm [Sudden] : sudden [5] : 5 [Radiating] : radiating [Shooting] : shooting [Intermittent] : intermittent [Leisure] : leisure [Social interactions] : social interactions [Meds] : meds [Walking] : walking [Bending forward] : bending forward [Extending back] : extending back [FreeTextEntry1] : 09/01/22 - Patient presents for a FUV following an repeat L4-5 LESI on 07/27/22. Patient reports 90% improvement at best with some return but not to baseline. She reports increased walking tolerance. Residual stiffness to the left leg. Patient is pleased with her response to the injection. \par \par 06/30/22 - Patient presents for a FUV following an L4-5 LESI on 06/10/22. Patient reports >80% reduction of leg pain following the injection. Patient reports residual stiffness to the left knee which subsides after 10 minutes. She reports increase functionality. Able to sit and walk for further distances. Patient is pleased with her response to the injection. \par \par 06/02/22 - The patient presents for initial evaluation regarding their low back pain.  Patient was referred by Dr. Torres. Patient c/o pain to the left lower extremity pain radiating to the groin and inferiorly to the left ankle. She reports increase of pain with prolonged sitting. She reports improvement of symptoms while walking. Patient reports onset of symptoms x 2 months ago. Patient denies any in sighting event to the affect area. Patient reports benefit with previous chiropractic trials. Patient reports having pain to the right leg in the past however symptoms resolved with chiropractic trial. Patient reports utilizing OTC Advil PRN with minimal to no benefit. Patient has not tried PT directed towards the lumbar spine. \par  \par Injections: \par 1) L4-5 LESI (06/10/22, 07/27/22)  \par  \par Pertinent Surgical History: N/A \par  \par Imaging: \par MRI Lumbar Spine (05/23/22) - [facility]\par \par L1-L2: There is disc bulging, bony ridging, facet hypertrophy and moderate left greater than right neural \par foraminal narrowing.\par L2-L3: There is disc bulging, bony ridging, facet hypertrophy, left paracentral/foraminal herniation, left L3 \par nerve root impingement, and left exiting L2 nerve root impingement with asymmetric left foraminal narrowing \par and mild central stenosis asymmetric on the left.\par L3-L4: There is disc bulging, bony ridging, facet arthrosis, left paracentral/foraminal herniation and \par impingement upon left L4 and left exiting L3 nerve roots with asymmetric left foraminal narrowing.\par L4-L5: There is disc bulging, bony ridging, facet arthrosis and broad-based posterior disc herniation asymmetric \par on the right with mild central stenosis and right exiting L4 nerve root impingement.\par L5-S1: There is anterolisthesis, disc bulging, bony ridging, and facet arthrosis with right greater than left \par foraminal narrowing.\par  \par Physician Disclaimer: I have personally reviewed and confirmed all HPI data with the patient.  [] : no [FreeTextEntry7] : bilateral legs [de-identified] : lumbar mri at [facility]

## 2022-09-02 NOTE — ASSESSMENT
[FreeTextEntry1] : A discussion regarding available pain management treatment options occurred with the patient.  These included interventional, rehabilitative, pharmacological, and alternative modalities. We will proceed with the following: \par \par Interventional treatment options: \par - none indicated at present time \par - could consider repeat left PM L4-5 LESI  with return of severe radicular pain; may remain on aspirin for indicated procedure \par - See additional instructions below\par \par Rehabilitative options: \par - initiate PT trial\par - encouraged participation in HEP; home exercise sheet provided \par \par Medication based treatment options: \par - continue OTC NSAIDs PRN\par - Patient prefers to avoid rx based therapies\par - See additional instructions below\par \par Complementary treatment options: \par - Failed chiropractic trial \par - lifestyle modifications discussed\par \par Additional treatment recommendations as follows: \par - F/U in 3 months or PRN \par \par The documentation recorded by the scribe, in my presence, accurately reflects the service I personally performed and the decisions made by me with my edits as appropriate.\par \par I, Moisés Campuzano acting as scribe, attest that this documentation has been prepared under the direction and in the presence of Provider Velasquez Barbosa DO.

## 2022-09-02 NOTE — PHYSICAL EXAM
[Normal LUE] : Left Upper Extremity: No scars, rashes, lesions, ulcers, skin intact [Normal Finger/nose] : finger to nose coordination [Normal] : no peripheral adenopathy appreciated [de-identified] : left wrist:\par Skin intact no swelling no erythema no ecchymosis\par No tenderness at the distal radius/ulna\par Range of motion of the wrist intact without pain\par No tenderness at the SL interval\par Moderate tenderness at the STT joint\par Moderate pain with grind of the basal joint\par No shoulder deformity, no MCP hyperextension\par Range of motion of the digits intact\par Sensation intact distally, capillary refill less than 2 seconds [de-identified] : enderr [de-identified] : x-rays from an urgent care reviewed 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  If too drying, patient may add a non-comedogenic moisturizer. The patient verbalized understanding of the proper use and possible adverse effects of retinoids.  All of the patient's questions and concerns were addressed.

## 2022-09-02 NOTE — PHYSICAL EXAM
[de-identified] : Constitutional: \par - No acute distress \par - Well developed; well nourished \par \par Neurological: \par - normal mood and affect \par - alert and oriented x 3  \par \par Cardiovascular: \par - grossly normal\par \par Lumbar Spine Exam: \par \par Inspection: \par erythema (-) \par ecchymosis (-) \par rashes (-) \par alignment: lumbar scoliosis\par \par Palpation:  \par paraspinal tenderness:                  L (-) ; R (-) \par thoracic paraspinal tenderness:    L (-) ; R (-) \par sciatic nerve tenderness :             L (-) ; R (-) \par SI joint tenderness:                        L (-) ; R (-) \par GTB tenderness:                            L (-);  R (-) \par \par ROM:   \par Full ROM with mild stiffness \par \par Strength:                   Right       Left    \par Hip Flexion:                (5/5)       (5/5) \par Quadriceps:               (5/5)       (5/5) \par Hamstrings:                (5/5)       (5/5) \par Ankle Dorsiflexion:     (5/5)       (5/5) \par EHL:                            (5/5)       (5/5) \par Ankle Plantarflexion:  (5/5)       (5/5) \par \par \par Special Tests: \par SLR:                      R (-) ; L (-) \par Facet loading:       R (-) ; L (-) \par JONATHAN test:          R (-) ; L (-) \par XSLR:                   R (-) ; L (-) \par Lavell's test:        R (-) ; L(-) \par Tight Hamstrings   R (-) ; L (-) \par \par Neurologic: \par Light touch intact throughout LE \par Reflexes normal and symmetric \par \par Gait: \par antalgic gait \par ambulates without assistive device

## 2022-09-08 ENCOUNTER — APPOINTMENT (OUTPATIENT)
Dept: FAMILY MEDICINE | Facility: CLINIC | Age: 87
End: 2022-09-08

## 2022-09-08 VITALS
HEIGHT: 66 IN | TEMPERATURE: 97 F | BODY MASS INDEX: 21.53 KG/M2 | OXYGEN SATURATION: 98 % | SYSTOLIC BLOOD PRESSURE: 108 MMHG | DIASTOLIC BLOOD PRESSURE: 70 MMHG | HEART RATE: 64 BPM | WEIGHT: 134 LBS

## 2022-09-08 PROCEDURE — 99214 OFFICE O/P EST MOD 30 MIN: CPT | Mod: 25

## 2022-09-08 PROCEDURE — 36415 COLL VENOUS BLD VENIPUNCTURE: CPT

## 2022-09-08 NOTE — HISTORY OF PRESENT ILLNESS
[FreeTextEntry1] : follow up  [de-identified] : no chest pain, no sob, no cough, no fever, no dizziness, no abdominal pain, no n/v/d/c/melena/brbpr/hematuria/dysuria\par

## 2022-09-15 LAB
25(OH)D3 SERPL-MCNC: 34.9 NG/ML
ALBUMIN SERPL ELPH-MCNC: 4.1 G/DL
ALP BLD-CCNC: 83 U/L
ALT SERPL-CCNC: 21 U/L
ANION GAP SERPL CALC-SCNC: 12 MMOL/L
APPEARANCE: CLEAR
AST SERPL-CCNC: 18 U/L
BACTERIA: NEGATIVE
BASOPHILS # BLD AUTO: 0.04 K/UL
BASOPHILS NFR BLD AUTO: 0.6 %
BILIRUB SERPL-MCNC: 0.6 MG/DL
BILIRUBIN URINE: NEGATIVE
BLOOD URINE: NEGATIVE
BUN SERPL-MCNC: 11 MG/DL
CALCIUM SERPL-MCNC: 9.7 MG/DL
CHLORIDE SERPL-SCNC: 93 MMOL/L
CHOLEST SERPL-MCNC: 142 MG/DL
CO2 SERPL-SCNC: 28 MMOL/L
COLOR: YELLOW
CREAT SERPL-MCNC: 0.66 MG/DL
CREAT SPEC-SCNC: 85 MG/DL
CREAT/PROT UR: 0.2 RATIO
EGFR: 85 ML/MIN/1.73M2
EOSINOPHIL # BLD AUTO: 0.04 K/UL
EOSINOPHIL NFR BLD AUTO: 0.6 %
ESTIMATED AVERAGE GLUCOSE: 137 MG/DL
GLUCOSE QUALITATIVE U: NEGATIVE
GLUCOSE SERPL-MCNC: 129 MG/DL
HBA1C MFR BLD HPLC: 6.4 %
HCT VFR BLD CALC: 41.6 %
HDLC SERPL-MCNC: 72 MG/DL
HGB BLD-MCNC: 13.1 G/DL
HYALINE CASTS: 1 /LPF
IMM GRANULOCYTES NFR BLD AUTO: 0.3 %
KETONES URINE: NEGATIVE
LDLC SERPL CALC-MCNC: 54 MG/DL
LEUKOCYTE ESTERASE URINE: NEGATIVE
LYMPHOCYTES # BLD AUTO: 1.29 K/UL
LYMPHOCYTES NFR BLD AUTO: 19.9 %
MAN DIFF?: NORMAL
MCHC RBC-ENTMCNC: 30.3 PG
MCHC RBC-ENTMCNC: 31.5 GM/DL
MCV RBC AUTO: 96.1 FL
MICROSCOPIC-UA: NORMAL
MONOCYTES # BLD AUTO: 0.55 K/UL
MONOCYTES NFR BLD AUTO: 8.5 %
NEUTROPHILS # BLD AUTO: 4.54 K/UL
NEUTROPHILS NFR BLD AUTO: 70.1 %
NITRITE URINE: NEGATIVE
NONHDLC SERPL-MCNC: 71 MG/DL
PH URINE: 8
PLATELET # BLD AUTO: 360 K/UL
POTASSIUM SERPL-SCNC: 4.9 MMOL/L
PROT SERPL-MCNC: 6.1 G/DL
PROT UR-MCNC: 14 MG/DL
PROTEIN URINE: NORMAL
RBC # BLD: 4.33 M/UL
RBC # FLD: 14 %
RED BLOOD CELLS URINE: 2 /HPF
SODIUM SERPL-SCNC: 132 MMOL/L
SPECIFIC GRAVITY URINE: 1.02
SQUAMOUS EPITHELIAL CELLS: 1 /HPF
TRIGL SERPL-MCNC: 82 MG/DL
UROBILINOGEN URINE: NORMAL
WBC # FLD AUTO: 6.48 K/UL
WHITE BLOOD CELLS URINE: 0 /HPF

## 2022-09-26 ENCOUNTER — APPOINTMENT (OUTPATIENT)
Dept: ORTHOPEDIC SURGERY | Facility: CLINIC | Age: 87
End: 2022-09-26

## 2022-09-26 ENCOUNTER — APPOINTMENT (OUTPATIENT)
Dept: FAMILY MEDICINE | Facility: CLINIC | Age: 87
End: 2022-09-26

## 2022-09-26 VITALS — WEIGHT: 134 LBS | HEIGHT: 66 IN | BODY MASS INDEX: 21.53 KG/M2

## 2022-09-26 PROCEDURE — G0008: CPT

## 2022-09-26 PROCEDURE — 73564 X-RAY EXAM KNEE 4 OR MORE: CPT | Mod: LT

## 2022-09-26 PROCEDURE — 90662 IIV NO PRSV INCREASED AG IM: CPT

## 2022-09-26 PROCEDURE — 99214 OFFICE O/P EST MOD 30 MIN: CPT

## 2022-09-26 PROCEDURE — 36415 COLL VENOUS BLD VENIPUNCTURE: CPT

## 2022-09-26 NOTE — DISCUSSION/SUMMARY
[de-identified] : She will follow up with pain management for possible epidural. If her knee pain is not resolved she will follow up with me, we may consider CSI. \par \par "Written by Celio Farah, acting as Scribe for Philip Hendricks M.D" \par \par Home Exercise \par \par  The patient is instructed on a home exercise program. \par  \par RICE \par I explained to the patient that rest, ice, compression, and elevation would benefit them.  They may return to activity after follow-up or when they no longer have any pain. \par  \par Pain Guide Activities \par The patient was advised to let pain guide the gradual advancement of activities. \par  \par Activity Modification \par The patient was advised to modify their activities. \par  \par Dx / Natural History \par The patient was advised of the diagnosis.  The natural history of the pathology was explained in full to the patient in layman's terms.  Several different treatment options were discussed and explained in full to the patient including the risks and benefits of both surgical and non-surgical treatments.  All questions and concerns were answered.\par

## 2022-09-26 NOTE — HISTORY OF PRESENT ILLNESS
[de-identified] : The patient is a 87 year old R hand dominant F who presents today complaining of LEFT KNEE PAIN.  Hx of spine treatment with our pain management. She is having pain that travel from left hip area to the right knee. She reports that the pain in the knee is worse then it ever has before. Has had 2 epidurals. The pain is mostly in the upper knee/ thigh. Patient has " a lot" of pain in the knee. \par Date of Injury/Onset: CHRONIC\par Pain:    At Rest: 2/10 \par With Activity:  8/10 \par Mechanism of injury: N/A \par This is NOT a Work Related Injury being treated under Worker's Compensation.\par This is NOT an athletic injury occurring associated with an interscholastic or organized sports team.\par Quality of symptoms:  SHARP, SWOLLEN , ACHING \par Improves with: REST\par Worse with: WALKING \par Prior treatment: N/A \par Prior Imaging: N.A\par Out of work/sport: _, since _\par School/Sport/Position/Occupation:_\par Additional Information: None\par

## 2022-09-26 NOTE — PHYSICAL EXAM
[5___] : hamstring 5[unfilled]/5 [Left] : left knee [All Views] : anteroposterior, lateral, skyline, and anteroposterior standing [de-identified] : Neurologic: normal coordination, normal DTR UE/LE , normal sensation, normal mood and affect, orientated and able to communicate.\par \par Skin: normal skin, no rash, no ulcers and no lesions.\par \par Lymphatic: no obvious lymphadenopathy in areas examined.\par \par Constitutional: well developed and well nourished.\par \par Cardiovascular: peripheral vascular exam is grossly normal.\par \par Pulmonary: no respiratory distress, lungs clear to auscultation bilaterally.\par \par Abdomen: normal bowel sounds, non-tender, no HSM and no mass.\par  [FreeTextEntry3] : lumbar radicular pain.  [] : no tenderness [FreeTextEntry9] : moderate medial OA.

## 2022-09-29 ENCOUNTER — APPOINTMENT (OUTPATIENT)
Dept: PAIN MANAGEMENT | Facility: CLINIC | Age: 87
End: 2022-09-29

## 2022-09-29 VITALS — BODY MASS INDEX: 21.53 KG/M2 | WEIGHT: 134 LBS | HEIGHT: 66 IN

## 2022-09-29 PROCEDURE — 99214 OFFICE O/P EST MOD 30 MIN: CPT

## 2022-09-30 LAB
ANION GAP SERPL CALC-SCNC: 9 MMOL/L
BUN SERPL-MCNC: 13 MG/DL
CALCIUM SERPL-MCNC: 9.8 MG/DL
CHLORIDE SERPL-SCNC: 97 MMOL/L
CO2 SERPL-SCNC: 29 MMOL/L
CREAT SERPL-MCNC: 0.7 MG/DL
EGFR: 84 ML/MIN/1.73M2
GLUCOSE SERPL-MCNC: 109 MG/DL
POTASSIUM SERPL-SCNC: 4.8 MMOL/L
SODIUM SERPL-SCNC: 134 MMOL/L

## 2022-09-30 NOTE — HISTORY OF PRESENT ILLNESS
[Lower back] : lower back [Left Arm] : left arm [Right Arm] : right arm [Sudden] : sudden [5] : 5 [Radiating] : radiating [Shooting] : shooting [Intermittent] : intermittent [Leisure] : leisure [Social interactions] : social interactions [Meds] : meds [Walking] : walking [Bending forward] : bending forward [Extending back] : extending back [FreeTextEntry1] : 9/29/22 - Patient presents for urgent FUV.  Patient c/o hip and knee pain, denies any lower back pain. Patient c/o intermittent groin pain. Patient reports that she was evaluated by Dr Mosqueda for this issue. Patient reports that she has been experiencing intermittent paresthesias in her left lower extremity. Patient reports starting PT, reports minimal improvements. Patient reports taking Tylenol and Advil without relief. \par \par 9/01/22 - Patient presents for a FUV following an repeat L4-5 LESI on 07/27/22. Patient reports 90% improvement at best with some return but not to baseline. She reports increased walking tolerance. Residual stiffness to the left leg. Patient is pleased with her response to the injection. \par \par 06/30/22 - Patient presents for a FUV following an L4-5 LESI on 06/10/22. Patient reports >80% reduction of leg pain following the injection. Patient reports residual stiffness to the left knee which subsides after 10 minutes. She reports increase functionality. Able to sit and walk for further distances. Patient is pleased with her response to the injection. \par \par 06/02/22 - The patient presents for initial evaluation regarding their low back pain.  Patient was referred by Dr. Torres. Patient c/o pain to the left lower extremity pain radiating to the groin and inferiorly to the left ankle. She reports increase of pain with prolonged sitting. She reports improvement of symptoms while walking. Patient reports onset of symptoms x 2 months ago. Patient denies any in sighting event to the affect area. Patient reports benefit with previous chiropractic trials. Patient reports having pain to the right leg in the past however symptoms resolved with chiropractic trial. Patient reports utilizing OTC Advil PRN with minimal to no benefit. Patient has not tried PT directed towards the lumbar spine. \par  \par Injections: \par 1) L4-5 LESI (06/10/22, 07/27/22)  \par  \par Pertinent Surgical History: N/A \par  \par Imaging: \par MRI Lumbar Spine (05/23/22) - [facility]\par \par L1-L2: There is disc bulging, bony ridging, facet hypertrophy and moderate left greater than right neural \par foraminal narrowing.\par L2-L3: There is disc bulging, bony ridging, facet hypertrophy, left paracentral/foraminal herniation, left L3 \par nerve root impingement, and left exiting L2 nerve root impingement with asymmetric left foraminal narrowing \par and mild central stenosis asymmetric on the left.\par L3-L4: There is disc bulging, bony ridging, facet arthrosis, left paracentral/foraminal herniation and \par impingement upon left L4 and left exiting L3 nerve roots with asymmetric left foraminal narrowing.\par L4-L5: There is disc bulging, bony ridging, facet arthrosis and broad-based posterior disc herniation asymmetric \par on the right with mild central stenosis and right exiting L4 nerve root impingement.\par L5-S1: There is anterolisthesis, disc bulging, bony ridging, and facet arthrosis with right greater than left \par foraminal narrowing.\par  \par Physician Disclaimer: I have personally reviewed and confirmed all HPI data with the patient.  [] : no [FreeTextEntry7] : bilateral legs [de-identified] : lumbar mri at [facility]

## 2022-09-30 NOTE — PHYSICAL EXAM
[de-identified] : Constitutional: \par - No acute distress \par - Well developed; well nourished \par \par Neurological: \par - normal mood and affect \par - alert and oriented x 3  \par \par Cardiovascular: \par - grossly normal\par \par Lumbar Spine Exam: \par \par Inspection: \par erythema (-) \par ecchymosis (-) \par rashes (-) \par alignment: lumbar scoliosis\par \par Palpation:  \par paraspinal tenderness:                  L (-) ; R (-) \par thoracic paraspinal tenderness:    L (-) ; R (-) \par sciatic nerve tenderness :             L (-) ; R (-) \par SI joint tenderness:                        L (-) ; R (-) \par GTB tenderness:                            L (-);  R (-) \par \par ROM:   \par Full ROM with mild stiffness \par \par Strength:                   Right       Left    \par Hip Flexion:                (5/5)       (5/5) \par Quadriceps:               (5/5)       (5/5) \par Hamstrings:                (5/5)       (5/5) \par Ankle Dorsiflexion:     (5/5)       (5/5) \par EHL:                            (5/5)       (5/5) \par Ankle Plantarflexion:  (5/5)       (5/5) \par \par \par Special Tests: \par SLR:                      R (-) ; L (+) \par Facet loading:       R (-) ; L (-) \par JONAHTAN test:          R (-) ; L (-) \par Tight Hamstrings   R (-) ; L (-) \par \par Neurologic: \par Light touch intact throughout LE \par Reflexes normal and symmetric \par \par Gait: \par antalgic gait \par ambulates without assistive device

## 2022-09-30 NOTE — ASSESSMENT
[FreeTextEntry1] : A discussion regarding available pain management treatment options occurred with the patient.  These included interventional, rehabilitative, pharmacological, and alternative modalities. We will proceed with the following: \par \par Interventional treatment options: \par - Proceed with left L3-4 TFESI with fluoroscopic guidance; may remain on aspirin for indicated procedure \par - See additional instructions below\par \par Rehabilitative options: \par - Hold PT due to perceived worsening\par - encouraged participation in HEP; home exercise sheet provided \par \par Medication based treatment options: \par - continue OTC NSAIDs PRN\par - Patient prefers to avoid rx: based therapies\par - Can consider gabapentin trials with failure of interventional therapies \par - See additional instructions below\par \par Complementary treatment options: \par - Failed chiropractic trial \par - lifestyle modifications discussed\par \par Additional treatment recommendations as follows: \par - Follow up 1-2 weeks post injection for assessment of efficacy and further recommendations\par \par The risks, benefits and alternatives of the proposed procedure were explained in detail with the patient. The risks outlined include but are not limited to infection, bleeding, post- dural puncture headache, nerve injury, a temporary increase in pain, failure to resolve symptoms, allergic reaction, and possible elevation of blood sugar in diabetics if using corticosteroid.  All questions were answered to patient's apparent satisfaction and he/she verbalized an understanding.\par \par We have discussed the risks, benefits, and alternatives NSAID therapy including but not limited to the risk of bleeding, thrombosis, gastric mucosal irritation/ulceration, allergic reaction and kidney dysfunction; the patient verbalizes an understanding.\par \par The documentation recorded by the scribe, in my presence, accurately reflects the service I personally performed and the decisions made by me with my edits as appropriate.\par \par I, Celio Griffith acting as scribe, attest that this documentation has been prepared under the direction and in the presence of Provider Velasquez Barbosa DO.

## 2022-10-01 ENCOUNTER — NON-APPOINTMENT (OUTPATIENT)
Age: 87
End: 2022-10-01

## 2022-10-02 ENCOUNTER — FORM ENCOUNTER (OUTPATIENT)
Age: 87
End: 2022-10-02

## 2022-10-05 ENCOUNTER — APPOINTMENT (OUTPATIENT)
Dept: PAIN MANAGEMENT | Facility: CLINIC | Age: 87
End: 2022-10-05

## 2022-10-12 ENCOUNTER — APPOINTMENT (OUTPATIENT)
Dept: PAIN MANAGEMENT | Facility: CLINIC | Age: 87
End: 2022-10-12

## 2022-10-12 PROCEDURE — 64483 NJX AA&/STRD TFRM EPI L/S 1: CPT | Mod: LT

## 2022-10-12 NOTE — PROCEDURE
[FreeTextEntry3] : Date of Service: 10/12/2022 \par \par Account: [id number]\par \par Patient: NEPTALI CURRAN \par \par YOB: 1935\par \par Age: 87 year\par \par Surgeon:      Velasquez Barbosa DO\par  \par Assistant:    None\par \par Pre-Operative Diagnosis:         Lumbosacral Radiculitis (M54.17)\par \par Post Operative Diagnosis:       Same\par \par Procedure:             Left L3-L4 transforaminal epidural steroid injection under fluoroscopic guidance.\par \par Anesthesia:           MAC\par \par This procedure was carried out using fluoroscopic guidance.  The risks and benefits of the procedure were discussed extensively with the patient.  The consent of the patient was obtained and the following procedure was performed.  The patient was placed in the prone position on the fluoroscopy table and the area was prepped and draped in a sterile fashion.  A timeout was performed with all essential staff present and the site and side were verified.\par \par The left L3-L4 neural foramen was then identified on right oblique "angel dog" anatomical view at the 6 o' clock position using fluoroscopic guidance, and the area was marked. The overlying skin and subcutaneous structures were anesthetized using sterile technique with 1% Lidocaine.   A 22 gauge 3.5 inch spinal needle was directed toward the inferior (6 o'clock) position of the pedicle, which formed the roof of the identified foramen.  Once in the epidural space, after negative aspiration for heme and CSF, 1cc of Omnipaque contrast was injected to confirm epidural location and assess filling defects and rule out intravascular needle placement.\par \par Lumbar epidurogram showed no intravascular or intrathecal flow pattern.  No blood or CSF was aspirated.  Omnipaque spread medially in epidural space and outlined the exiting nerve root. \par \par After this, an injectate of 3 cc preservative free normal saline plus 40 mg of Kenalog was injected in the epidural space.\par \par The needle was subsequently removed.  Vital signs remained normal.  Pulse oximeter was used throughout the procedure and the patient's pulse and oxygen saturation remained within normal limits.  The patient tolerated the procedure well.  There were no complications.  The patient was instructed to apply ice over the injection sites for twenty minutes every two hours for the next 24 to 48 hours.\par \par Disposition:\par      1. The patient was advised to F/U in 1-2 weeks to assess the response to the injection.\par      2. The patient was also instructed to contact me immediately if there were any concerns related to the procedure performed.

## 2022-10-20 ENCOUNTER — APPOINTMENT (OUTPATIENT)
Dept: FAMILY MEDICINE | Facility: CLINIC | Age: 87
End: 2022-10-20

## 2022-10-20 DIAGNOSIS — E87.1 HYPO-OSMOLALITY AND HYPONATREMIA: ICD-10-CM

## 2022-10-20 PROCEDURE — 36415 COLL VENOUS BLD VENIPUNCTURE: CPT

## 2022-10-21 LAB
ANION GAP SERPL CALC-SCNC: 13 MMOL/L
BUN SERPL-MCNC: 21 MG/DL
CALCIUM SERPL-MCNC: 9.8 MG/DL
CHLORIDE SERPL-SCNC: 98 MMOL/L
CO2 SERPL-SCNC: 26 MMOL/L
CREAT SERPL-MCNC: 0.79 MG/DL
EGFR: 73 ML/MIN/1.73M2
GLUCOSE SERPL-MCNC: 110 MG/DL
POTASSIUM SERPL-SCNC: 4.7 MMOL/L
SODIUM SERPL-SCNC: 137 MMOL/L

## 2022-10-31 ENCOUNTER — APPOINTMENT (OUTPATIENT)
Dept: PAIN MANAGEMENT | Facility: CLINIC | Age: 87
End: 2022-10-31

## 2022-11-01 ENCOUNTER — APPOINTMENT (OUTPATIENT)
Dept: PAIN MANAGEMENT | Facility: CLINIC | Age: 87
End: 2022-11-01

## 2022-11-01 VITALS — BODY MASS INDEX: 21.53 KG/M2 | WEIGHT: 134 LBS | HEIGHT: 66 IN

## 2022-11-01 DIAGNOSIS — M51.26 OTHER INTERVERTEBRAL DISC DISPLACEMENT, LUMBAR REGION: ICD-10-CM

## 2022-11-01 PROCEDURE — 99213 OFFICE O/P EST LOW 20 MIN: CPT

## 2022-11-01 NOTE — PHYSICAL EXAM
[de-identified] : Constitutional: \par - No acute distress \par - Well developed; well nourished \par \par Neurological: \par - normal mood and affect \par - alert and oriented x 3  \par \par Cardiovascular: \par - grossly normal\par \par Lumbar Spine Exam: \par \par Inspection: \par erythema (-) \par ecchymosis (-) \par rashes (-) \par alignment: lumbar scoliosis\par \par Palpation:  \par paraspinal tenderness:                  L (-) ; R (-) \par thoracic paraspinal tenderness:    L (-) ; R (-) \par sciatic nerve tenderness :             L (-) ; R (-) \par SI joint tenderness:                        L (-) ; R (-) \par GTB tenderness:                            L (+);  R (-) \par \par ROM:   \par Full ROM with mild stiffness \par \par Strength:                   Right       Left    \par Hip Flexion:                (5/5)       (5/5) \par Quadriceps:               (5/5)       (5/5) \par Hamstrings:                (5/5)       (5/5) \par Ankle Dorsiflexion:     (5/5)       (5/5) \par EHL:                            (5/5)       (5/5) \par Ankle Plantarflexion:  (5/5)       (5/5) \par \par Special Tests: \par SLR:                      R (-) ; L (-) \par Facet loading:       R (-) ; L (-) \par JONATHAN test:          R (-) ; L (-) \par Tight Hamstrings   R (-) ; L (-) \par \par Neurologic: \par Light touch intact throughout LE bilaterally\par Reflexes normal and symmetric bilaterally\par \par Gait: \par antalgic gait \par ambulates without assistive device

## 2022-11-01 NOTE — ASSESSMENT
[FreeTextEntry1] : A discussion regarding available pain management treatment options occurred with the patient.  These included interventional, rehabilitative, pharmacological, and alternative modalities. We will proceed with the following: \par \par Interventional treatment options: \par - none indicated at present time\par - counseled patient regarding limitations of corticosteroid administration\par - See additional instructions below\par \par Rehabilitative options: \par - Restart physical therapy trial\par - encouraged participation in HEP as tolerated  \par \par Medication based treatment options: \par - continue OTC NSAIDs PRN\par - initiate trial of Gabapentin 100 mg TID; titration schedule provided \par - See additional instructions below\par \par Complementary treatment options: \par - Failed chiropractic trial \par - lifestyle modifications discussed\par \par Additional treatment recommendations as follows: \par - Follow up 6 weeks to assess response to conservative care\par \par We have discussed the risks, benefits, and alternatives NSAID therapy including but not limited to the risk of bleeding, thrombosis, gastric mucosal irritation/ulceration, allergic reaction and kidney dysfunction; the patient verbalizes an understanding.\par \par IEstela, acting as scribe, attest that this documentation has been prepared under the direction and in the presence of Provider Velasquez Barbosa DO.\par \par The documentation recorded by the scribe, in my presence, accurately reflects the service I personally performed and the decisions made by me with my edits as appropriate.

## 2022-11-01 NOTE — HISTORY OF PRESENT ILLNESS
[Lower back] : lower back [Left Arm] : left arm [Right Arm] : right arm [Sudden] : sudden [5] : 5 [Radiating] : radiating [Shooting] : shooting [Intermittent] : intermittent [Leisure] : leisure [Social interactions] : social interactions [Meds] : meds [Walking] : walking [Bending forward] : bending forward [Extending back] : extending back [FreeTextEntry1] : 11/1/22 -  Patient presents today for a follow-up visit s/p left L3-L4 TFESI on 10/12/2022.  She reports that she experienced significant relief after the procedure, greater than 80% improvement. She is no longer experiencing paresthesias to her left lower extremity. However, she states that on the third day she was on the toilet and bent down to reach for something when she experienced a sharp pain to her left hip. She notes that this pain is different from the original pain she was experiencing. She now predominantly complains of left sided hip pain and left calf pain. She has tried CBD lotion along with medical massages which provides temporary mild relief. Pain worsens with bending and twisting motions. Pain alleviates with sitting. \par \par 9/29/22 - Patient presents for urgent FUV.  Patient c/o hip and knee pain, denies any lower back pain. Patient c/o intermittent groin pain. Patient reports that she was evaluated by Dr Mosqueda for this issue. Patient reports that she has been experiencing intermittent paresthesias in her left lower extremity. Patient reports starting PT, reports minimal improvements. Patient reports taking Tylenol and Advil without relief. \par \par 9/01/22 - Patient presents for a FUV following an repeat L4-5 LESI on 07/27/22. Patient reports 90% improvement at best with some return but not to baseline. She reports increased walking tolerance. Residual stiffness to the left leg. Patient is pleased with her response to the injection. \par \par 06/30/22 - Patient presents for a FUV following an L4-5 LESI on 06/10/22. Patient reports >80% reduction of leg pain following the injection. Patient reports residual stiffness to the left knee which subsides after 10 minutes. She reports increase functionality. Able to sit and walk for further distances. Patient is pleased with her response to the injection. \par \par 06/02/22 - The patient presents for initial evaluation regarding their low back pain.  Patient was referred by Dr. Torres. Patient c/o pain to the left lower extremity pain radiating to the groin and inferiorly to the left ankle. She reports increase of pain with prolonged sitting. She reports improvement of symptoms while walking. Patient reports onset of symptoms x 2 months ago. Patient denies any in sighting event to the affect area. Patient reports benefit with previous chiropractic trials. Patient reports having pain to the right leg in the past however symptoms resolved with chiropractic trial. Patient reports utilizing OTC Advil PRN with minimal to no benefit. Patient has not tried PT directed towards the lumbar spine. \par  \par Injections: \par 1) L4-5 LESI (06/10/22, 7/27/22)\par 2) left L3-L4 TFESI (10/12/2022)\par  \par Pertinent Surgical History: N/A \par  \par Imaging: \par MRI Lumbar Spine (05/23/22) - [facility]\par \par L1-L2: There is disc bulging, bony ridging, facet hypertrophy and moderate left greater than right neural \par foraminal narrowing.\par L2-L3: There is disc bulging, bony ridging, facet hypertrophy, left paracentral/foraminal herniation, left L3 \par nerve root impingement, and left exiting L2 nerve root impingement with asymmetric left foraminal narrowing \par and mild central stenosis asymmetric on the left.\par L3-L4: There is disc bulging, bony ridging, facet arthrosis, left paracentral/foraminal herniation and \par impingement upon left L4 and left exiting L3 nerve roots with asymmetric left foraminal narrowing.\par L4-L5: There is disc bulging, bony ridging, facet arthrosis and broad-based posterior disc herniation asymmetric \par on the right with mild central stenosis and right exiting L4 nerve root impingement.\par L5-S1: There is anterolisthesis, disc bulging, bony ridging, and facet arthrosis with right greater than left \par foraminal narrowing.\par  \par Physician Disclaimer: I have personally reviewed and confirmed all HPI data with the patient.  [] : no [FreeTextEntry7] : bilateral legs [de-identified] : lumbar mri at [facility]

## 2022-11-03 ENCOUNTER — APPOINTMENT (OUTPATIENT)
Dept: PAIN MANAGEMENT | Facility: CLINIC | Age: 87
End: 2022-11-03

## 2022-11-21 ENCOUNTER — APPOINTMENT (OUTPATIENT)
Dept: PAIN MANAGEMENT | Facility: CLINIC | Age: 87
End: 2022-11-21

## 2022-11-21 VITALS — HEIGHT: 66 IN | WEIGHT: 134 LBS | BODY MASS INDEX: 21.53 KG/M2

## 2022-11-21 PROCEDURE — 99213 OFFICE O/P EST LOW 20 MIN: CPT

## 2022-11-21 NOTE — ASSESSMENT
[FreeTextEntry1] : A discussion regarding available pain management treatment options occurred with the patient.  These included interventional, rehabilitative, pharmacological, and alternative modalities. We will proceed with the following: \par \par Interventional treatment options: \par - none indicated at present time\par - counseled patient regarding limitations of corticosteroid administration; injection therapy should be reserved for episodes of severe exacerbations\par - See additional instructions below\par \par Rehabilitative options: \par - Restart physical therapy trial as tolerated\par - encouraged participation in HEP as tolerated  \par \par Medication based treatment options: \par - continue OTC NSAIDs PRN\par - Continue gabapentin 100 mg TID; titration schedule provided to target dosage of 600 mg daily\par - Continue oral CBD products\par - See additional instructions below\par \par Complementary treatment options: \par - Failed chiropractic trial \par - lifestyle modifications discussed\par - Medical massage prescription provided\par \par Additional treatment recommendations as follows: \par - Follow up 6 weeks to assess response to conservative care\par \par We have discussed the risks, benefits, and alternatives NSAID therapy including but not limited to the risk of bleeding, thrombosis, gastric mucosal irritation/ulceration, allergic reaction and kidney dysfunction; the patient verbalizes an understanding.

## 2022-11-21 NOTE — PHYSICAL EXAM
[de-identified] : Constitutional: \par - No acute distress \par - Well developed; well nourished \par \par Neurological: \par - normal mood and affect \par - alert and oriented x 3  \par \par Cardiovascular: \par - grossly normal\par \par Lumbar Spine Exam: \par \par Inspection: \par erythema (-) \par ecchymosis (-) \par rashes (-) \par alignment: lumbar scoliosis\par \par Palpation:  \par paraspinal tenderness:                  L (-) ; R (-) \par thoracic paraspinal tenderness:    L (-) ; R (-) \par sciatic nerve tenderness :             L (-) ; R (-) \par SI joint tenderness:                        L (-) ; R (-) \par GTB tenderness:                            L (+);  R (-) \par \par ROM:   \par Full ROM with mild stiffness \par \par Strength:                   Right       Left    \par Hip Flexion:                (5/5)       (5/5) \par Quadriceps:               (5/5)       (5/5) \par Hamstrings:                (5/5)       (5/5) \par Ankle Dorsiflexion:     (5/5)       (5/5) \par EHL:                            (5/5)       (5/5) \par Ankle Plantarflexion:  (5/5)       (5/5) \par \par Special Tests: \par SLR:                      R (-) ; L (EQ) \par Facet loading:       R (-) ; L (-) \par JONATHAN test:          R (-) ; L (-) \par Tight Hamstrings   R (-) ; L (-) \par \par Neurologic: \par Light touch intact throughout LE bilaterally\par Reflexes normal and symmetric bilaterally\par \par Gait: \par Mildly antalgic gait \par ambulates without assistive device

## 2022-11-21 NOTE — HISTORY OF PRESENT ILLNESS
[Lower back] : lower back [Left Arm] : left arm [Right Arm] : right arm [Sudden] : sudden [5] : 5 [Radiating] : radiating [Shooting] : shooting [Intermittent] : intermittent [Leisure] : leisure [Social interactions] : social interactions [Meds] : meds [Walking] : walking [Bending forward] : bending forward [Extending back] : extending back [FreeTextEntry1] : 11/21/22 -  Patient presents for urgent follow-up visit.  She reports another incident of severe increase of left-sided radicular pain which occurred after reaching.  This is her second such incident.  The pain resolved after 1 to 2 days.  Tried medicinal marijuana with some benefit.  Currently on gabapentin 300 mg daily without identifiable benefit.  Was unable to tolerate PT however wishes to retrial.  Continues to deny any lower extremity weakness, bladder/bowel dysfunction, or saddle numbness.\par \par 11/1/22 -  Patient presents today for a follow-up visit s/p left L3-L4 TFESI on 10/12/2022.  She reports that she experienced significant relief after the procedure, greater than 80% improvement. She is no longer experiencing paresthesias to her left lower extremity. However, she states that on the third day she was on the toilet and bent down to reach for something when she experienced a sharp pain to her left hip. She notes that this pain is different from the original pain she was experiencing. She now predominantly complains of left sided hip pain and left calf pain. She has tried CBD lotion along with medical massages which provides temporary mild relief. Pain worsens with bending and twisting motions. Pain alleviates with sitting. \par \par 9/29/22 - Patient presents for urgent FUV.  Patient c/o hip and knee pain, denies any lower back pain. Patient c/o intermittent groin pain. Patient reports that she was evaluated by Dr Mosqueda for this issue. Patient reports that she has been experiencing intermittent paresthesias in her left lower extremity. Patient reports starting PT, reports minimal improvements. Patient reports taking Tylenol and Advil without relief. \par \par 9/01/22 - Patient presents for a FUV following an repeat L4-5 LESI on 07/27/22. Patient reports 90% improvement at best with some return but not to baseline. She reports increased walking tolerance. Residual stiffness to the left leg. Patient is pleased with her response to the injection. \par \par 06/30/22 - Patient presents for a FUV following an L4-5 LESI on 06/10/22. Patient reports >80% reduction of leg pain following the injection. Patient reports residual stiffness to the left knee which subsides after 10 minutes. She reports increase functionality. Able to sit and walk for further distances. Patient is pleased with her response to the injection. \par \par 06/02/22 - The patient presents for initial evaluation regarding their low back pain.  Patient was referred by Dr. Torres. Patient c/o pain to the left lower extremity pain radiating to the groin and inferiorly to the left ankle. She reports increase of pain with prolonged sitting. She reports improvement of symptoms while walking. Patient reports onset of symptoms x 2 months ago. Patient denies any in sighting event to the affect area. Patient reports benefit with previous chiropractic trials. Patient reports having pain to the right leg in the past however symptoms resolved with chiropractic trial. Patient reports utilizing OTC Advil PRN with minimal to no benefit. Patient has not tried PT directed towards the lumbar spine. \par  \par Injections: \par 1) L4-5 LESI (06/10/22, 7/27/22)\par 2) left L3-L4 TFESI (10/12/2022)\par  \par Pertinent Surgical History: N/A \par  \par Imaging: \par MRI Lumbar Spine (05/23/22) - [facility]\par \par L1-L2: There is disc bulging, bony ridging, facet hypertrophy and moderate left greater than right neural \par foraminal narrowing.\par L2-L3: There is disc bulging, bony ridging, facet hypertrophy, left paracentral/foraminal herniation, left L3 \par nerve root impingement, and left exiting L2 nerve root impingement with asymmetric left foraminal narrowing \par and mild central stenosis asymmetric on the left.\par L3-L4: There is disc bulging, bony ridging, facet arthrosis, left paracentral/foraminal herniation and \par impingement upon left L4 and left exiting L3 nerve roots with asymmetric left foraminal narrowing.\par L4-L5: There is disc bulging, bony ridging, facet arthrosis and broad-based posterior disc herniation asymmetric \par on the right with mild central stenosis and right exiting L4 nerve root impingement.\par L5-S1: There is anterolisthesis, disc bulging, bony ridging, and facet arthrosis with right greater than left \par foraminal narrowing.\par  \par Physician Disclaimer: I have personally reviewed and confirmed all HPI data with the patient.  [] : no [FreeTextEntry7] : bilateral legs [de-identified] : lumbar mri at [facility]

## 2022-12-06 ENCOUNTER — APPOINTMENT (OUTPATIENT)
Dept: FAMILY MEDICINE | Facility: CLINIC | Age: 87
End: 2022-12-06

## 2022-12-06 VITALS
DIASTOLIC BLOOD PRESSURE: 72 MMHG | OXYGEN SATURATION: 98 % | BODY MASS INDEX: 21.86 KG/M2 | HEART RATE: 67 BPM | WEIGHT: 136 LBS | SYSTOLIC BLOOD PRESSURE: 112 MMHG | HEIGHT: 66 IN | TEMPERATURE: 97.6 F

## 2022-12-06 DIAGNOSIS — Z00.00 ENCOUNTER FOR GENERAL ADULT MEDICAL EXAMINATION W/OUT ABNORMAL FINDINGS: ICD-10-CM

## 2022-12-06 DIAGNOSIS — Z13.820 ENCOUNTER FOR SCREENING FOR OSTEOPOROSIS: ICD-10-CM

## 2022-12-06 PROCEDURE — G0439: CPT

## 2022-12-06 PROCEDURE — 36415 COLL VENOUS BLD VENIPUNCTURE: CPT

## 2022-12-06 NOTE — HISTORY OF PRESENT ILLNESS
[FreeTextEntry1] : physical  [de-identified] : no chest pain, no sob, no cough, no fever, no dizziness, no abdominal pain, no n/v/d/c/melena/brbpr/hematuria/dysuria\par admits to going to pain management for her back and gabapentin and cbd has helped - going to PT also \par

## 2022-12-06 NOTE — HEALTH RISK ASSESSMENT
[Good] : ~his/her~  mood as  good [Never] : Never [Yes] : Yes [No] : In the past 12 months have you used drugs other than those required for medical reasons? No [No falls in past year] : Patient reported no falls in the past year [0] : 2) Feeling down, depressed, or hopeless: Not at all (0) [Patient reported mammogram was normal] : Patient reported mammogram was normal [Patient declined PAP Smear] : Patient declined PAP Smear [Patient declined colonoscopy] : Patient declined colonoscopy [None] : None [Alone] : lives alone [Retired] : retired [Graduate School] : graduate school [Feels Safe at Home] : Feels safe at home [Fully functional (bathing, dressing, toileting, transferring, walking, feeding)] : Fully functional (bathing, dressing, toileting, transferring, walking, feeding) [Fully functional (using the telephone, shopping, preparing meals, housekeeping, doing laundry, using] : Fully functional and needs no help or supervision to perform IADLs (using the telephone, shopping, preparing meals, housekeeping, doing laundry, using transportation, managing medications and managing finances) [Reports changes in hearing] : Reports no changes in hearing [Reports changes in vision] : Reports no changes in vision [Reports changes in dental health] : Reports no changes in dental health [MammogramDate] : 2022

## 2022-12-08 LAB
ALBUMIN SERPL ELPH-MCNC: 4.3 G/DL
ALP BLD-CCNC: 66 U/L
ALT SERPL-CCNC: 23 U/L
ANION GAP SERPL CALC-SCNC: 10 MMOL/L
APPEARANCE: CLEAR
AST SERPL-CCNC: 23 U/L
BACTERIA: NEGATIVE
BASOPHILS # BLD AUTO: 0.04 K/UL
BASOPHILS NFR BLD AUTO: 0.8 %
BILIRUB SERPL-MCNC: 0.5 MG/DL
BILIRUBIN URINE: NEGATIVE
BLOOD URINE: NEGATIVE
BUN SERPL-MCNC: 11 MG/DL
CALCIUM SERPL-MCNC: 9.8 MG/DL
CHLORIDE SERPL-SCNC: 98 MMOL/L
CHOLEST SERPL-MCNC: 136 MG/DL
CO2 SERPL-SCNC: 28 MMOL/L
COLOR: NORMAL
CREAT SERPL-MCNC: 0.67 MG/DL
CREAT SPEC-SCNC: 35 MG/DL
CREAT/PROT UR: 0.1 RATIO
EGFR: 85 ML/MIN/1.73M2
EOSINOPHIL # BLD AUTO: 0.11 K/UL
EOSINOPHIL NFR BLD AUTO: 2.1 %
ESTIMATED AVERAGE GLUCOSE: 128 MG/DL
GLUCOSE QUALITATIVE U: NEGATIVE
GLUCOSE SERPL-MCNC: 116 MG/DL
HBA1C MFR BLD HPLC: 6.1 %
HCT VFR BLD CALC: 40.6 %
HDLC SERPL-MCNC: 75 MG/DL
HGB BLD-MCNC: 12.5 G/DL
HYALINE CASTS: 0 /LPF
IMM GRANULOCYTES NFR BLD AUTO: 0 %
KETONES URINE: NEGATIVE
LDLC SERPL CALC-MCNC: 48 MG/DL
LEUKOCYTE ESTERASE URINE: NEGATIVE
LYMPHOCYTES # BLD AUTO: 1.31 K/UL
LYMPHOCYTES NFR BLD AUTO: 25.1 %
MAN DIFF?: NORMAL
MCHC RBC-ENTMCNC: 29.4 PG
MCHC RBC-ENTMCNC: 30.8 GM/DL
MCV RBC AUTO: 95.5 FL
MICROSCOPIC-UA: NORMAL
MONOCYTES # BLD AUTO: 0.42 K/UL
MONOCYTES NFR BLD AUTO: 8 %
NEUTROPHILS # BLD AUTO: 3.34 K/UL
NEUTROPHILS NFR BLD AUTO: 64 %
NITRITE URINE: NEGATIVE
NONHDLC SERPL-MCNC: 60 MG/DL
PH URINE: 6.5
PLATELET # BLD AUTO: 297 K/UL
POTASSIUM SERPL-SCNC: 4.4 MMOL/L
PROT SERPL-MCNC: 6.3 G/DL
PROT UR-MCNC: 5 MG/DL
PROTEIN URINE: NEGATIVE
RBC # BLD: 4.25 M/UL
RBC # FLD: 13.2 %
RED BLOOD CELLS URINE: 1 /HPF
SODIUM SERPL-SCNC: 136 MMOL/L
SPECIFIC GRAVITY URINE: 1.01
SQUAMOUS EPITHELIAL CELLS: 1 /HPF
TRIGL SERPL-MCNC: 61 MG/DL
TSH SERPL-ACNC: 2.82 UIU/ML
UROBILINOGEN URINE: NORMAL
WBC # FLD AUTO: 5.22 K/UL
WHITE BLOOD CELLS URINE: 0 /HPF

## 2022-12-12 ENCOUNTER — RX RENEWAL (OUTPATIENT)
Age: 87
End: 2022-12-12

## 2022-12-13 ENCOUNTER — OUTPATIENT (OUTPATIENT)
Dept: OUTPATIENT SERVICES | Facility: HOSPITAL | Age: 87
LOS: 1 days | End: 2022-12-13
Payer: MEDICARE

## 2022-12-13 ENCOUNTER — APPOINTMENT (OUTPATIENT)
Dept: RADIOLOGY | Facility: CLINIC | Age: 87
End: 2022-12-13

## 2022-12-13 DIAGNOSIS — Z41.9 ENCOUNTER FOR PROCEDURE FOR PURPOSES OTHER THAN REMEDYING HEALTH STATE, UNSPECIFIED: Chronic | ICD-10-CM

## 2022-12-13 DIAGNOSIS — Z98.890 OTHER SPECIFIED POSTPROCEDURAL STATES: Chronic | ICD-10-CM

## 2022-12-13 DIAGNOSIS — Z90.49 ACQUIRED ABSENCE OF OTHER SPECIFIED PARTS OF DIGESTIVE TRACT: Chronic | ICD-10-CM

## 2022-12-13 DIAGNOSIS — Z90.710 ACQUIRED ABSENCE OF BOTH CERVIX AND UTERUS: Chronic | ICD-10-CM

## 2022-12-13 DIAGNOSIS — Z13.820 ENCOUNTER FOR SCREENING FOR OSTEOPOROSIS: ICD-10-CM

## 2022-12-13 PROCEDURE — 77085 DXA BONE DENSITY AXL VRT FX: CPT | Mod: 26

## 2022-12-13 PROCEDURE — 77085 DXA BONE DENSITY AXL VRT FX: CPT

## 2022-12-29 ENCOUNTER — APPOINTMENT (OUTPATIENT)
Dept: FAMILY MEDICINE | Facility: CLINIC | Age: 87
End: 2022-12-29
Payer: MEDICARE

## 2022-12-29 VITALS
OXYGEN SATURATION: 96 % | TEMPERATURE: 97.1 F | HEIGHT: 66 IN | SYSTOLIC BLOOD PRESSURE: 112 MMHG | BODY MASS INDEX: 21.86 KG/M2 | DIASTOLIC BLOOD PRESSURE: 70 MMHG | HEART RATE: 71 BPM | WEIGHT: 136 LBS

## 2022-12-29 DIAGNOSIS — N75.0 CYST OF BARTHOLIN'S GLAND: ICD-10-CM

## 2022-12-29 PROCEDURE — 99213 OFFICE O/P EST LOW 20 MIN: CPT

## 2023-01-02 PROBLEM — N75.0 BARTHOLIN CYST: Status: ACTIVE | Noted: 2023-01-02

## 2023-01-02 NOTE — HISTORY OF PRESENT ILLNESS
[FreeTextEntry8] : c/o pimple in groin area x monday\par States it has diminished\par ON AMOXIL FOR DENTAL WORK

## 2023-01-02 NOTE — PLAN
[FreeTextEntry1] : RESOLVING CYST\par CONTINUE WITH AMOXICILLIN AND HOT SOAKS AND BACTRACIN\par CALL IF RE  DEVELOPS

## 2023-01-02 NOTE — PHYSICAL EXAM
[Normal] : no acute distress, well nourished, well developed and well-appearing [de-identified] : SMALL RESOLVING CYST RT LABIA, NON TENDER

## 2023-03-14 ENCOUNTER — RX RENEWAL (OUTPATIENT)
Age: 88
End: 2023-03-14

## 2023-05-09 ENCOUNTER — APPOINTMENT (OUTPATIENT)
Dept: FAMILY MEDICINE | Facility: CLINIC | Age: 88
End: 2023-05-09
Payer: MEDICARE

## 2023-05-09 VITALS
TEMPERATURE: 97.7 F | BODY MASS INDEX: 22.27 KG/M2 | DIASTOLIC BLOOD PRESSURE: 68 MMHG | WEIGHT: 138 LBS | OXYGEN SATURATION: 97 % | HEART RATE: 52 BPM | SYSTOLIC BLOOD PRESSURE: 110 MMHG

## 2023-05-09 DIAGNOSIS — R93.89 ABNORMAL FINDINGS ON DIAGNOSTIC IMAGING OF OTHER SPECIFIED BODY STRUCTURES: ICD-10-CM

## 2023-05-09 PROCEDURE — 36415 COLL VENOUS BLD VENIPUNCTURE: CPT

## 2023-05-09 PROCEDURE — 99214 OFFICE O/P EST MOD 30 MIN: CPT | Mod: 25

## 2023-05-09 NOTE — REVIEW OF SYSTEMS
[Night Sweats] : night sweats [Back Pain] : back pain [Negative] : Heme/Lymph [FreeTextEntry2] : chronic

## 2023-05-09 NOTE — PHYSICAL EXAM
[No Acute Distress] : no acute distress [Well Nourished] : well nourished [Well Developed] : well developed [Well-Appearing] : well-appearing [Normal Voice/Communication] : normal voice/communication [Normal Sclera/Conjunctiva] : normal sclera/conjunctiva [Normal Outer Ear/Nose] : the outer ears and nose were normal in appearance [No JVD] : no jugular venous distention [No Respiratory Distress] : no respiratory distress  [No Accessory Muscle Use] : no accessory muscle use [Clear to Auscultation] : lungs were clear to auscultation bilaterally [Normal Rate] : normal rate  [Regular Rhythm] : with a regular rhythm [Normal S1, S2] : normal S1 and S2 [No Edema] : there was no peripheral edema [Soft] : abdomen soft [Non Tender] : non-tender [Non-distended] : non-distended [No Masses] : no abdominal mass palpated [No HSM] : no HSM [Normal Bowel Sounds] : normal bowel sounds [No Spinal Tenderness] : no spinal tenderness [No Joint Swelling] : no joint swelling [Grossly Normal Strength/Tone] : grossly normal strength/tone [No Rash] : no rash [Coordination Grossly Intact] : coordination grossly intact [No Focal Deficits] : no focal deficits [Normal Gait] : normal gait [Speech Grossly Normal] : speech grossly normal [Normal Affect] : the affect was normal [Alert and Oriented x3] : oriented to person, place, and time [Normal Mood] : the mood was normal [Normal Insight/Judgement] : insight and judgment were intact [de-identified] : systolic murmur

## 2023-05-09 NOTE — HISTORY OF PRESENT ILLNESS
[FreeTextEntry1] : PT for follow up [de-identified] : LBP, s/p several steroid shots but not effective long term. On gabapentin w/ relief of pain. Rx by pain management/orthopedics. Seen by cardiologist - everything normal.\par no chest pain, no sob, no cough, no fever, no dizziness, no abdominal pain, no n/v/d/c/melena/brbpr/hematuria/dysuria\par

## 2023-05-10 LAB
ALBUMIN SERPL ELPH-MCNC: 4.5 G/DL
ALP BLD-CCNC: 90 U/L
ALT SERPL-CCNC: 20 U/L
ANION GAP SERPL CALC-SCNC: 12 MMOL/L
APPEARANCE: CLEAR
AST SERPL-CCNC: 22 U/L
BACTERIA: NEGATIVE /HPF
BASOPHILS # BLD AUTO: 0.07 K/UL
BASOPHILS NFR BLD AUTO: 1.2 %
BILIRUB SERPL-MCNC: 0.5 MG/DL
BILIRUBIN URINE: NEGATIVE
BLOOD URINE: NEGATIVE
BUN SERPL-MCNC: 17 MG/DL
CALCIUM SERPL-MCNC: 9.8 MG/DL
CAST: 0 /LPF
CHLORIDE SERPL-SCNC: 101 MMOL/L
CHOLEST SERPL-MCNC: 139 MG/DL
CO2 SERPL-SCNC: 25 MMOL/L
COLOR: YELLOW
CREAT SERPL-MCNC: 0.75 MG/DL
EGFR: 77 ML/MIN/1.73M2
EOSINOPHIL # BLD AUTO: 0.2 K/UL
EOSINOPHIL NFR BLD AUTO: 3.4 %
EPITHELIAL CELLS: 2 /HPF
ESTIMATED AVERAGE GLUCOSE: 126 MG/DL
FERRITIN SERPL-MCNC: 95 NG/ML
FOLATE SERPL-MCNC: 12.9 NG/ML
GLUCOSE QUALITATIVE U: NEGATIVE MG/DL
GLUCOSE SERPL-MCNC: 129 MG/DL
HBA1C MFR BLD HPLC: 6 %
HCT VFR BLD CALC: 40.6 %
HDLC SERPL-MCNC: 79 MG/DL
HGB BLD-MCNC: 12.9 G/DL
IMM GRANULOCYTES NFR BLD AUTO: 0.2 %
IRON SATN MFR SERPL: 21 %
IRON SERPL-MCNC: 73 UG/DL
KETONES URINE: NEGATIVE MG/DL
LDLC SERPL CALC-MCNC: 45 MG/DL
LEUKOCYTE ESTERASE URINE: ABNORMAL
LYMPHOCYTES # BLD AUTO: 1.62 K/UL
LYMPHOCYTES NFR BLD AUTO: 27.5 %
MAN DIFF?: NORMAL
MCHC RBC-ENTMCNC: 29.1 PG
MCHC RBC-ENTMCNC: 31.8 GM/DL
MCV RBC AUTO: 91.6 FL
MICROSCOPIC-UA: NORMAL
MONOCYTES # BLD AUTO: 0.45 K/UL
MONOCYTES NFR BLD AUTO: 7.6 %
NEUTROPHILS # BLD AUTO: 3.54 K/UL
NEUTROPHILS NFR BLD AUTO: 60.1 %
NITRITE URINE: NEGATIVE
NONHDLC SERPL-MCNC: 59 MG/DL
PH URINE: 6
PLATELET # BLD AUTO: 307 K/UL
POTASSIUM SERPL-SCNC: 4.3 MMOL/L
PROT SERPL-MCNC: 6.7 G/DL
PROTEIN URINE: NEGATIVE MG/DL
RBC # BLD: 4.43 M/UL
RBC # FLD: 13.4 %
RED BLOOD CELLS URINE: 2 /HPF
SODIUM SERPL-SCNC: 138 MMOL/L
SPECIFIC GRAVITY URINE: 1.01
TIBC SERPL-MCNC: 339 UG/DL
TRIGL SERPL-MCNC: 70 MG/DL
TSH SERPL-ACNC: 2.68 UIU/ML
UIBC SERPL-MCNC: 267 UG/DL
UROBILINOGEN URINE: 0.2 MG/DL
VIT B12 SERPL-MCNC: 253 PG/ML
WBC # FLD AUTO: 5.89 K/UL
WHITE BLOOD CELLS URINE: 0 /HPF

## 2023-05-11 ENCOUNTER — NON-APPOINTMENT (OUTPATIENT)
Age: 88
End: 2023-05-11

## 2023-05-15 ENCOUNTER — APPOINTMENT (OUTPATIENT)
Dept: PAIN MANAGEMENT | Facility: CLINIC | Age: 88
End: 2023-05-15
Payer: MEDICARE

## 2023-05-15 VITALS — WEIGHT: 138 LBS | BODY MASS INDEX: 22.18 KG/M2 | HEIGHT: 66 IN

## 2023-05-15 DIAGNOSIS — M54.16 RADICULOPATHY, LUMBAR REGION: ICD-10-CM

## 2023-05-15 DIAGNOSIS — M48.061 SPINAL STENOSIS, LUMBAR REGION WITHOUT NEUROGENIC CLAUDICATION: ICD-10-CM

## 2023-05-15 DIAGNOSIS — M51.36 OTHER INTERVERTEBRAL DISC DEGENERATION, LUMBAR REGION: ICD-10-CM

## 2023-05-15 PROCEDURE — 99213 OFFICE O/P EST LOW 20 MIN: CPT

## 2023-05-15 NOTE — ASSESSMENT
[FreeTextEntry1] : A discussion regarding available pain management treatment options occurred with the patient.  These included interventional, rehabilitative, pharmacological, and alternative modalities. We will proceed with the following: \par \par Interventional treatment options: \par - none indicated at present time\par - Previously counseled patient regarding limitations of corticosteroid administration; injection therapy should be reserved for episodes of severe exacerbations\par - See additional instructions below\par \par Rehabilitative options: \par - Completed physical therapy with benefit\par - encouraged participation in HEP as tolerated  \par \par Medication based treatment options: \par - continue Tylenol 500-1000 mg up to TID as needed\par - Continue gabapentin 100 mg TID; down titration schedule discussed in detail\par - Continue oral CBD products\par - See additional instructions below\par \par Complementary treatment options: \par - Failed chiropractic trial \par - lifestyle modifications discussed\par - Medical massage prescription provided\par \par Additional treatment recommendations as follows: \par - Follow-up in 6 months or as needed basis\par \par The documentation recorded by the scribe, in my presence, accurately reflects the service I personally performed and the decisions made by me with my edits as appropriate. \par \par I, Rafael Agee acting as scribe, attest that this documentation has been prepared under the direction and in the presence of Provider Velasquez Barbosa DO.

## 2023-05-15 NOTE — HISTORY OF PRESENT ILLNESS
[Lower back] : lower back [Left Arm] : left arm [Right Arm] : right arm [Sudden] : sudden [3] : 3 [Radiating] : radiating [Shooting] : shooting [Intermittent] : intermittent [Leisure] : leisure [Social interactions] : social interactions [Meds] : meds [Walking] : walking [Bending forward] : bending forward [Extending back] : extending back [FreeTextEntry1] : 5/15/2023 - Patient presents for FUV regarding their lower back pain. Patient has pain in her left hip, and there are radicular symptoms raveling down the left leg. Patient has managed her pain with PT and gabapentin (200 mg BID) with significant benefit.  She is pleased with her response.  Here for medication renewal.\par \par 11/21/22 -  Patient presents for urgent follow-up visit.  She reports another incident of severe increase of left-sided radicular pain which occurred after reaching.  This is her second such incident.  The pain resolved after 1 to 2 days.  Tried medicinal marijuana with some benefit.  Currently on gabapentin 300 mg daily without identifiable benefit.  Was unable to tolerate PT however wishes to retrial.  Continues to deny any lower extremity weakness, bladder/bowel dysfunction, or saddle numbness.\par \par 11/1/22 -  Patient presents today for a follow-up visit s/p left L3-L4 TFESI on 10/12/2022.  She reports that she experienced significant relief after the procedure, greater than 80% improvement. She is no longer experiencing paresthesias to her left lower extremity. However, she states that on the third day she was on the toilet and bent down to reach for something when she experienced a sharp pain to her left hip. She notes that this pain is different from the original pain she was experiencing. She now predominantly complains of left sided hip pain and left calf pain. She has tried CBD lotion along with medical massages which provides temporary mild relief. Pain worsens with bending and twisting motions. Pain alleviates with sitting. \par \par 9/29/22 - Patient presents for urgent FUV.  Patient c/o hip and knee pain, denies any lower back pain. Patient c/o intermittent groin pain. Patient reports that she was evaluated by Dr Mosqueda for this issue. Patient reports that she has been experiencing intermittent paresthesias in her left lower extremity. Patient reports starting PT, reports minimal improvements. Patient reports taking Tylenol and Advil without relief. \par \par 9/01/22 - Patient presents for a FUV following an repeat L4-5 LESI on 07/27/22. Patient reports 90% improvement at best with some return but not to baseline. She reports increased walking tolerance. Residual stiffness to the left leg. Patient is pleased with her response to the injection. \par \par 06/30/22 - Patient presents for a FUV following an L4-5 LESI on 06/10/22. Patient reports >80% reduction of leg pain following the injection. Patient reports residual stiffness to the left knee which subsides after 10 minutes. She reports increase functionality. Able to sit and walk for further distances. Patient is pleased with her response to the injection. \par \par 06/02/22 - The patient presents for initial evaluation regarding their low back pain.  Patient was referred by Dr. Torres. Patient c/o pain to the left lower extremity pain radiating to the groin and inferiorly to the left ankle. She reports increase of pain with prolonged sitting. She reports improvement of symptoms while walking. Patient reports onset of symptoms x 2 months ago. Patient denies any in sighting event to the affect area. Patient reports benefit with previous chiropractic trials. Patient reports having pain to the right leg in the past however symptoms resolved with chiropractic trial. Patient reports utilizing OTC Advil PRN with minimal to no benefit. Patient has not tried PT directed towards the lumbar spine. \par  \par Injections: \par 1) L4-5 LESI (06/10/22, 7/27/22)\par 2) left L3-L4 TFESI (10/12/2022)\par  \par Pertinent Surgical History: N/A \par  \par Imaging: \par 1) MRI Lumbar Spine (05/23/22) - [facility]\par L1-L2: There is disc bulging, bony ridging, facet hypertrophy and moderate left greater than right neural \par foraminal narrowing.\par L2-L3: There is disc bulging, bony ridging, facet hypertrophy, left paracentral/foraminal herniation, left L3 \par nerve root impingement, and left exiting L2 nerve root impingement with asymmetric left foraminal narrowing \par and mild central stenosis asymmetric on the left.\par L3-L4: There is disc bulging, bony ridging, facet arthrosis, left paracentral/foraminal herniation and \par impingement upon left L4 and left exiting L3 nerve roots with asymmetric left foraminal narrowing.\par L4-L5: There is disc bulging, bony ridging, facet arthrosis and broad-based posterior disc herniation asymmetric \par on the right with mild central stenosis and right exiting L4 nerve root impingement.\par L5-S1: There is anterolisthesis, disc bulging, bony ridging, and facet arthrosis with right greater than left \par foraminal narrowing.\par  \par Physician Disclaimer: I have personally reviewed and confirmed all HPI data with the patient.  [] : no [FreeTextEntry7] : bilateral legs [de-identified] : lumbar mri at [facility]

## 2023-05-15 NOTE — PHYSICAL EXAM
[de-identified] : Constitutional: \par - No acute distress \par - Well developed; well nourished \par \par Neurological: \par - normal mood and affect \par - alert and oriented x 3  \par \par Cardiovascular: \par - grossly normal\par \par Lumbar Spine Exam: \par \par Inspection: \par erythema (-) \par ecchymosis (-) \par rashes (-) \par alignment: lumbar scoliosis\par \par Palpation:  \par paraspinal tenderness:                  L (-) ; R (-) \par thoracic paraspinal tenderness:    L (-) ; R (-) \par sciatic nerve tenderness :             L (-) ; R (-) \par SI joint tenderness:                        L (-) ; R (-) \par GTB tenderness:                            L (-);  R (-) \par \par ROM:   \par Full ROM with mild stiffness \par \par Strength:                   Right       Left    \par Hip Flexion:                (5/5)       (5/5) \par Quadriceps:               (5/5)       (5/5) \par Hamstrings:                (5/5)       (5/5) \par Ankle Dorsiflexion:     (5/5)       (5/5) \par EHL:                            (5/5)       (5/5) \par Ankle Plantarflexion:  (5/5)       (5/5) \par \par Special Tests: \par SLR:                      R (-) ; L (EQ) \par Facet loading:       R (-) ; L (-) \par JONATHAN test:          R (-) ; L (-) \par Tight Hamstrings   R (-) ; L (-) \par \par Neurologic: \par Light touch intact throughout LE bilaterally\par Reflexes normal and symmetric bilaterally\par \par Gait: \par Mildly antalgic gait

## 2023-05-23 NOTE — PHYSICAL EXAM
----- Message from Susan Alcantara sent at 5/22/2023  5:37 PM CDT -----  Regarding: Unread Message Notification  Contact: [phone]  Andrea Dill,    Please see the following from Dr. Gee:    Your thyroid level was normal. Your CMP was also normal (your kidney, liver and electrolyte levels). Your total cholesterol level was slightly elevated at 235, optimal is less than 200. This can be due to diet or medications such as olanzapine. Your triglycerides were elevated but can be expected to be elevated if you were not fasting at the time of your labs. To improve your cholesterol levels you can incorporate exercise and diet changes as well into your life.     Exercise: The CDC recommends 150 minutes of physical activity each week. You don't have to do it all at once. It could be 30 minutes a day, 5 days a week. You can spread your activity out during the week and break it up into smaller chunks of time.  Diet: Recommend limiting foods high in saturated fats. Saturated fats come from animal products (such as cheese, fatty meats, and dairy desserts) and tropical oils (such as palm oil). We recommend eating foods low in saturated fats, transfats and high in unsaturated fats like olive oil, avocados, nuts. We also recommend lean meats; seafood; fat-free or low-fat milk, cheese, and yogurt; whole grains; and fruits and vegetables and foods high in fiber such as oatmeal and turner beans. And avoiding processed or fried foods.       Please let me know if you have any questions.    Thank you,  NEEL Hussein   [No Acute Distress] : no acute distress [Normal Sclera/Conjunctiva] : normal sclera/conjunctiva [Well-Appearing] : well-appearing [Normal Outer Ear/Nose] : the outer ears and nose were normal in appearance [No Respiratory Distress] : no respiratory distress  [PERRL] : pupils equal round and reactive to light [Normal Rate] : normal rate  [Clear to Auscultation] : lungs were clear to auscultation bilaterally [No Accessory Muscle Use] : no accessory muscle use [Regular Rhythm] : with a regular rhythm [Soft] : abdomen soft [Non Tender] : non-tender [Normal Bowel Sounds] : normal bowel sounds [Non-distended] : non-distended [No Joint Swelling] : no joint swelling [Grossly Normal Strength/Tone] : grossly normal strength/tone [Normal Affect] : the affect was normal [No Focal Deficits] : no focal deficits [No Rash] : no rash [Normal Insight/Judgement] : insight and judgment were intact

## 2023-08-15 ENCOUNTER — APPOINTMENT (OUTPATIENT)
Dept: ORTHOPEDIC SURGERY | Facility: CLINIC | Age: 88
End: 2023-08-15
Payer: MEDICARE

## 2023-08-15 DIAGNOSIS — M65.341 TRIGGER FINGER, RIGHT RING FINGER: ICD-10-CM

## 2023-08-15 DIAGNOSIS — M65.342 TRIGGER FINGER, LEFT RING FINGER: ICD-10-CM

## 2023-08-15 PROCEDURE — 99213 OFFICE O/P EST LOW 20 MIN: CPT

## 2023-08-17 PROBLEM — M65.342 TRIGGER RING FINGER OF LEFT HAND: Status: ACTIVE | Noted: 2023-08-15

## 2023-08-17 NOTE — HISTORY OF PRESENT ILLNESS
[de-identified] : 88F, RHD, No PMHx presents with left hand ring finger locking since April 2023. Reports having trouble grasping. Admits to frequent numbness in fingers at night.

## 2023-08-17 NOTE — IMAGING
Providers


Date of admission: 


07/05/19 08:14





Expected date of discharge: 07/06/19


Attending physician: 


Nazia Caceres





Primary care physician: 


Vladimir Mcmahon








- Discharge Diagnosis(es)


(1) Acute pancreatitis


Status: Acute   





(2) Transaminitis


Status: Acute   





(3) Acute cholecystitis


Status: Acute   


Hospital Course: 











POSTOPERATIVE DIAGNOSES:  


1.  Acute pancreatitis due to gallstones


2.  Symptomatic gallstones with acute cholecystitis due to cystic duct 

obstruction


3.  Right upper quadrant abdominal pain with epigastric abdominal pain


4.  Elevated liver enzymes, transaminitis


5.  Asthma


6.  Seasonal ALLERGIES





COURSE: The patient is a 77-year-old female who presented with gallstone-induced

pancreatitis with acute cholecystitis and elevated transaminases as transfer 

from [facility]. Her liver enzymes are moderately improved. No nausea

or vomiting. She has intolerance to Dilaudid, "it made everything spinning." Her

chest and abdominal pain is resolved. Her pancreatitis is also resolved. No 

jaundice on exam. Incisions are clean dry and intact. I personally went over her

intraoperative photos.





ROS: No reports of nausea and vomiting.  No bowel movements.  No fevers or 

chills.  No new chest pain.  No productive sputum





PHYSICAL EXAM: 


VITAL SIGNS: Reviewed


CONSTITUTIONAL:  Well developed and in no acute distress. 


EYES:  Conjuctivae without sclera icterus. Extraocular movements grossly intact.




HEAD, EARS, NOSE, THROAT: Moist buccal mucosa. Head is atraumatic, 

normocephalic. Hears conversational speech. No nasal drainage.


NECK:  Supple. No thyroidomegaly.


RESPIRATORY:  Non-labored respirations and equal bilateral excursions.


CARDIOVASCULAR:  Palpable 2+ radial pulses.  Regular rate.  Regular rhythm.


ABDOMEN: Incisions clean dry and intact.  Soft.  No peritonitis.  


MUSCULOSKELETAL:  No gross deformity of the lower extremities noted.  No 

clubbing.  No cyanosis.


SKIN: Good skin turgor. Well perfused. 


NEUROLOGIC: Cranial nerves I through XII grossly intact.  No focal or 

lateralizing signs. 


PSYCH:  Appropriate affect.  Alert and oriented to person, place and time. 





CLINCAL LABS: Lipase is normal





ASSESSMENT: 


1.  Acute gallstone pancreatitis with cholecystitis





PLAN: 


1. Patient is stable for discharge as she is tolerating regular diet. 


2. Dilaudid to be added to her allergy list as she has intolerance to the drug. 

She cannot take aspirin products. 


3. For pain control, acetaminophen and ice as needed advised. 


4. As she lives 1 hr away, follow up with her PCP in 1 week. 


5. She may follow up with me July 30th which she states works best for her 

schedule. 








Procedures: 





OPERATION:       


Robotic-assisted da Lyubov Xi laparoscopic cholecystectomy, multiport with 

FIREFLY





Patient Condition at Discharge: Good





Plan - Discharge Summary


Discharge Rx Participant: Yes


New Discharge Prescriptions: 


New


   Acetaminophen [Tylenol] 325 mg PO Q4H #30 tab





Continue


   Budesonide/Formoterol Fumarate [Symbicort 160-4.5 Mcg Inhaler] 2 puff 

INHALATION RT-BID


   Montelukast [Singulair] 10 mg PO HS


   Cetirizine HCl [Zyrtec] 10 mg PO HS


   Calcium Carbonate [Calcium] 1,200 mg PO DAILY


Discharge Medication List





Budesonide/Formoterol Fumarate [Symbicort 160-4.5 Mcg Inhaler] 2 puff INHALATION

RT-BID 07/04/19 [History]


Calcium Carbonate [Calcium] 1,200 mg PO DAILY 07/04/19 [History]


Cetirizine HCl [Zyrtec] 10 mg PO HS 07/04/19 [History]


Montelukast [Singulair] 10 mg PO HS 07/04/19 [History]


Acetaminophen [Tylenol] 325 mg PO Q4H #30 tab 07/06/19 [Rx]








Follow up Appointment(s)/Referral(s): 


Nazia Caceres MD [STAFF PHYSICIAN] - 07/30/19


Bernardo Hays PAC [Family Provider] - 1-2 Days


Patient Instructions/Handouts:  Pancreatitis (DC), Gallstones (DC), Low Fat Diet

(DC), Laparoscopic Cholecystectomy (GEN)


Activity/Diet/Wound Care/Special Instructions: 


May shower. No bathtub soaks until, July 15th. No lifting over 10 pounds until 

July 15th. Use ice for incisions for pain. Use hydrogen peroxide along incisions

after showering to prevent infections.  Low fat diet until Friday, July 12th.


Discharge Disposition: HOME SELF-CARE [de-identified] : Left hand with no swelling or erythema. Able to make fist, oppose thumb to small finger with good thenar bulk. No intrinsic atrophy. Tender along distal palmar crease, most notable at ring finger. No overt locking with catching palpable. Sensation intact throughout with <2sec cap refill.

## 2023-08-17 NOTE — ASSESSMENT
[FreeTextEntry1] : Left RF trigger - Discussed with patient the pathoanatomy of trigger and options going forward. Risks/benefits discussed as well as natural progression that injection will provide some relief but symptoms may recur. Discussed that pain may worsen in coming days but will subside. Discussed that we can repeat an injection or that operative intervention may be indicated down the line.  F/u prn (for CSI)

## 2023-08-20 ENCOUNTER — NON-APPOINTMENT (OUTPATIENT)
Age: 88
End: 2023-08-20

## 2023-08-30 ENCOUNTER — OFFICE (OUTPATIENT)
Dept: URBAN - METROPOLITAN AREA CLINIC 12 | Facility: CLINIC | Age: 88
Setting detail: OPHTHALMOLOGY
End: 2023-08-30
Payer: MEDICARE

## 2023-08-30 VITALS — HEIGHT: 55 IN

## 2023-08-30 DIAGNOSIS — H35.363: ICD-10-CM

## 2023-08-30 DIAGNOSIS — Z96.1: ICD-10-CM

## 2023-08-30 DIAGNOSIS — H43.393: ICD-10-CM

## 2023-08-30 DIAGNOSIS — H43.813: ICD-10-CM

## 2023-08-30 DIAGNOSIS — H35.373: ICD-10-CM

## 2023-08-30 DIAGNOSIS — D31.30: ICD-10-CM

## 2023-08-30 DIAGNOSIS — D31.31: ICD-10-CM

## 2023-08-30 DIAGNOSIS — H16.223: ICD-10-CM

## 2023-08-30 DIAGNOSIS — H02.403: ICD-10-CM

## 2023-08-30 PROBLEM — H52.7 REFRACTIVE ERROR ; BOTH EYES: Status: ACTIVE | Noted: 2023-08-30

## 2023-08-30 PROCEDURE — 92014 COMPRE OPH EXAM EST PT 1/>: CPT | Performed by: OPHTHALMOLOGY

## 2023-08-30 PROCEDURE — 92250 FUNDUS PHOTOGRAPHY W/I&R: CPT | Performed by: OPHTHALMOLOGY

## 2023-08-30 ASSESSMENT — REFRACTION_CURRENTRX
OS_ADD: +2.75
OS_VPRISM_DIRECTION: BF
OS_OVR_VA: 20/
OD_VPRISM_DIRECTION: BF
OD_OVR_VA: 20/
OD_CYLINDER: SPHERE
OS_ADD: +2.50
OS_VPRISM_DIRECTION: SV
OS_CYLINDER: SPHERE
OD_VPRISM_DIRECTION: SV
OS_SPHERE: PLANO
OD_ADD: +2.75
OS_OVR_VA: 20/
OD_OVR_VA: 20/
OD_SPHERE: PLANO
OD_ADD: +2.50

## 2023-08-30 ASSESSMENT — LID POSITION - PTOSIS
OS_PTOSIS: LUL 1+ 2+
OD_PTOSIS: RUL 1+ 2+

## 2023-08-30 ASSESSMENT — KERATOMETRY
OD_K1POWER_DIOPTERS: 44.50
OS_K2POWER_DIOPTERS: 45.50
OD_K2POWER_DIOPTERS: 45.75
OS_K1POWER_DIOPTERS: 44.75
METHOD_AUTO_MANUAL: AUTO
OD_AXISANGLE_DEGREES: 92
OS_AXISANGLE_DEGREES: 73

## 2023-08-30 ASSESSMENT — REFRACTION_AUTOREFRACTION
OS_SPHERE: +0.25
OD_SPHERE: -0.25
OD_CYLINDER: -0.25
OS_CYLINDER: -0.50
OD_AXIS: 16
OS_AXIS: 114

## 2023-08-30 ASSESSMENT — TONOMETRY
OS_IOP_MMHG: 12
OD_IOP_MMHG: 13

## 2023-08-30 ASSESSMENT — VISUAL ACUITY
OS_BCVA: 20/30
OD_BCVA: 20/30-2

## 2023-08-30 ASSESSMENT — AXIALLENGTH_DERIVED
OS_AL: 23.0098
OD_AL: 23.1496
OS_AL: 22.9635

## 2023-08-30 ASSESSMENT — REFRACTION_MANIFEST
OD_CYLINDER: -0.50
OD_ADD: +2.50
OD_VA1: 20/25-1
OS_AXIS: 005
OS_VA1: 20/25-1
OS_ADD: +2.50
OS_SPHERE: +0.25
OD_SPHERE: PLANO
OS_CYLINDER: -0.25
OD_AXIS: 035

## 2023-08-30 ASSESSMENT — CONFRONTATIONAL VISUAL FIELD TEST (CVF)
OD_FINDINGS: FULL
OS_FINDINGS: FULL

## 2023-08-30 ASSESSMENT — SPHEQUIV_DERIVED
OS_SPHEQUIV: 0.125
OS_SPHEQUIV: 0
OD_SPHEQUIV: -0.375

## 2023-08-30 ASSESSMENT — SUPERFICIAL PUNCTATE KERATITIS (SPK)
OS_SPK: 1+
OD_SPK: 1+

## 2023-08-31 ENCOUNTER — NON-APPOINTMENT (OUTPATIENT)
Age: 88
End: 2023-08-31

## 2023-09-12 ENCOUNTER — APPOINTMENT (OUTPATIENT)
Dept: FAMILY MEDICINE | Facility: CLINIC | Age: 88
End: 2023-09-12
Payer: MEDICARE

## 2023-09-12 VITALS
HEART RATE: 60 BPM | OXYGEN SATURATION: 95 % | BODY MASS INDEX: 22.6 KG/M2 | TEMPERATURE: 97.8 F | DIASTOLIC BLOOD PRESSURE: 60 MMHG | SYSTOLIC BLOOD PRESSURE: 114 MMHG | WEIGHT: 140 LBS

## 2023-09-12 PROCEDURE — 36415 COLL VENOUS BLD VENIPUNCTURE: CPT

## 2023-09-12 PROCEDURE — 99213 OFFICE O/P EST LOW 20 MIN: CPT | Mod: 25

## 2023-09-14 LAB
ALBUMIN SERPL ELPH-MCNC: 4.4 G/DL
ALP BLD-CCNC: 92 U/L
ALT SERPL-CCNC: 35 U/L
ANION GAP SERPL CALC-SCNC: 16 MMOL/L
APPEARANCE: CLEAR
AST SERPL-CCNC: 27 U/L
BACTERIA: NEGATIVE /HPF
BASOPHILS # BLD AUTO: 0.05 K/UL
BASOPHILS NFR BLD AUTO: 0.8 %
BILIRUB SERPL-MCNC: 0.5 MG/DL
BILIRUBIN URINE: NEGATIVE
BLOOD URINE: NEGATIVE
BUN SERPL-MCNC: 13 MG/DL
CALCIUM SERPL-MCNC: 9.8 MG/DL
CAST: 1 /LPF
CHLORIDE SERPL-SCNC: 98 MMOL/L
CHOLEST SERPL-MCNC: 151 MG/DL
CO2 SERPL-SCNC: 22 MMOL/L
COLOR: YELLOW
CREAT SERPL-MCNC: 0.75 MG/DL
EGFR: 77 ML/MIN/1.73M2
EOSINOPHIL # BLD AUTO: 0.14 K/UL
EOSINOPHIL NFR BLD AUTO: 2.3 %
EPITHELIAL CELLS: 1 /HPF
ESTIMATED AVERAGE GLUCOSE: 126 MG/DL
GLUCOSE QUALITATIVE U: NEGATIVE MG/DL
GLUCOSE SERPL-MCNC: 117 MG/DL
HBA1C MFR BLD HPLC: 6 %
HCT VFR BLD CALC: 40.8 %
HDLC SERPL-MCNC: 75 MG/DL
HGB BLD-MCNC: 12.7 G/DL
IMM GRANULOCYTES NFR BLD AUTO: 0.2 %
KETONES URINE: NEGATIVE MG/DL
LDLC SERPL CALC-MCNC: 60 MG/DL
LEUKOCYTE ESTERASE URINE: ABNORMAL
LYMPHOCYTES # BLD AUTO: 1.62 K/UL
LYMPHOCYTES NFR BLD AUTO: 27.2 %
MAN DIFF?: NORMAL
MCHC RBC-ENTMCNC: 29.4 PG
MCHC RBC-ENTMCNC: 31.1 GM/DL
MCV RBC AUTO: 94.4 FL
MICROSCOPIC-UA: NORMAL
MONOCYTES # BLD AUTO: 0.48 K/UL
MONOCYTES NFR BLD AUTO: 8.1 %
NEUTROPHILS # BLD AUTO: 3.66 K/UL
NEUTROPHILS NFR BLD AUTO: 61.4 %
NITRITE URINE: NEGATIVE
NONHDLC SERPL-MCNC: 76 MG/DL
PH URINE: 7
PLATELET # BLD AUTO: 274 K/UL
POTASSIUM SERPL-SCNC: 4.5 MMOL/L
PROT SERPL-MCNC: 6.7 G/DL
PROTEIN URINE: NEGATIVE MG/DL
RBC # BLD: 4.32 M/UL
RBC # FLD: 13.7 %
RED BLOOD CELLS URINE: 1 /HPF
SODIUM SERPL-SCNC: 136 MMOL/L
SPECIFIC GRAVITY URINE: 1.01
TRIGL SERPL-MCNC: 87 MG/DL
TSH SERPL-ACNC: 2.41 UIU/ML
UROBILINOGEN URINE: 0.2 MG/DL
WBC # FLD AUTO: 5.96 K/UL
WHITE BLOOD CELLS URINE: 1 /HPF

## 2023-10-20 ENCOUNTER — APPOINTMENT (OUTPATIENT)
Dept: FAMILY MEDICINE | Facility: CLINIC | Age: 88
End: 2023-10-20
Payer: MEDICARE

## 2023-10-20 PROCEDURE — 90662 IIV NO PRSV INCREASED AG IM: CPT

## 2023-10-20 PROCEDURE — G0008: CPT

## 2023-12-11 ENCOUNTER — APPOINTMENT (OUTPATIENT)
Dept: FAMILY MEDICINE | Facility: CLINIC | Age: 88
End: 2023-12-11
Payer: MEDICARE

## 2023-12-11 VITALS
SYSTOLIC BLOOD PRESSURE: 122 MMHG | DIASTOLIC BLOOD PRESSURE: 62 MMHG | WEIGHT: 144 LBS | HEIGHT: 66 IN | OXYGEN SATURATION: 97 % | HEART RATE: 87 BPM | BODY MASS INDEX: 23.14 KG/M2

## 2023-12-11 DIAGNOSIS — R31.9 HEMATURIA, UNSPECIFIED: ICD-10-CM

## 2023-12-11 DIAGNOSIS — E55.9 VITAMIN D DEFICIENCY, UNSPECIFIED: ICD-10-CM

## 2023-12-11 DIAGNOSIS — Z12.39 ENCOUNTER FOR OTHER SCREENING FOR MALIGNANT NEOPLASM OF BREAST: ICD-10-CM

## 2023-12-11 DIAGNOSIS — R73.09 OTHER ABNORMAL GLUCOSE: ICD-10-CM

## 2023-12-11 PROCEDURE — 99213 OFFICE O/P EST LOW 20 MIN: CPT | Mod: 25

## 2023-12-11 PROCEDURE — 36415 COLL VENOUS BLD VENIPUNCTURE: CPT

## 2023-12-13 LAB
25(OH)D3 SERPL-MCNC: 35.6 NG/ML
ALBUMIN SERPL ELPH-MCNC: 4.4 G/DL
ALP BLD-CCNC: 90 U/L
ALT SERPL-CCNC: 26 U/L
ANION GAP SERPL CALC-SCNC: 12 MMOL/L
APPEARANCE: CLEAR
AST SERPL-CCNC: 21 U/L
BACTERIA: NEGATIVE /HPF
BASOPHILS # BLD AUTO: 0.05 K/UL
BASOPHILS NFR BLD AUTO: 1 %
BILIRUB SERPL-MCNC: 0.4 MG/DL
BILIRUBIN URINE: NEGATIVE
BLOOD URINE: NEGATIVE
BUN SERPL-MCNC: 13 MG/DL
CALCIUM SERPL-MCNC: 9.7 MG/DL
CAST: 0 /LPF
CHLORIDE SERPL-SCNC: 99 MMOL/L
CHOLEST SERPL-MCNC: 144 MG/DL
CO2 SERPL-SCNC: 25 MMOL/L
COLOR: YELLOW
CREAT SERPL-MCNC: 0.75 MG/DL
EGFR: 77 ML/MIN/1.73M2
EOSINOPHIL # BLD AUTO: 0.07 K/UL
EOSINOPHIL NFR BLD AUTO: 1.4 %
EPITHELIAL CELLS: 3 /HPF
ESTIMATED AVERAGE GLUCOSE: 126 MG/DL
GLUCOSE QUALITATIVE U: NEGATIVE MG/DL
GLUCOSE SERPL-MCNC: 122 MG/DL
HBA1C MFR BLD HPLC: 6 %
HCT VFR BLD CALC: 40.5 %
HDLC SERPL-MCNC: 83 MG/DL
HGB BLD-MCNC: 12.9 G/DL
IMM GRANULOCYTES NFR BLD AUTO: 0.2 %
KETONES URINE: NEGATIVE MG/DL
LDLC SERPL CALC-MCNC: 49 MG/DL
LEUKOCYTE ESTERASE URINE: ABNORMAL
LYMPHOCYTES # BLD AUTO: 1.33 K/UL
LYMPHOCYTES NFR BLD AUTO: 26.1 %
MAN DIFF?: NORMAL
MCHC RBC-ENTMCNC: 29.5 PG
MCHC RBC-ENTMCNC: 31.9 GM/DL
MCV RBC AUTO: 92.7 FL
MICROSCOPIC-UA: NORMAL
MONOCYTES # BLD AUTO: 0.34 K/UL
MONOCYTES NFR BLD AUTO: 6.7 %
NEUTROPHILS # BLD AUTO: 3.3 K/UL
NEUTROPHILS NFR BLD AUTO: 64.6 %
NITRITE URINE: NEGATIVE
NONHDLC SERPL-MCNC: 61 MG/DL
PH URINE: 7
PLATELET # BLD AUTO: 305 K/UL
POTASSIUM SERPL-SCNC: 4.4 MMOL/L
PROT SERPL-MCNC: 6.4 G/DL
PROTEIN URINE: NEGATIVE MG/DL
RBC # BLD: 4.37 M/UL
RBC # FLD: 14.2 %
RED BLOOD CELLS URINE: 2 /HPF
SODIUM SERPL-SCNC: 136 MMOL/L
SPECIFIC GRAVITY URINE: 1.02
TRIGL SERPL-MCNC: 58 MG/DL
UROBILINOGEN URINE: 1 MG/DL
WBC # FLD AUTO: 5.1 K/UL
WHITE BLOOD CELLS URINE: 1 /HPF

## 2023-12-30 ENCOUNTER — NON-APPOINTMENT (OUTPATIENT)
Age: 88
End: 2023-12-30

## 2023-12-31 ENCOUNTER — TRANSCRIPTION ENCOUNTER (OUTPATIENT)
Age: 88
End: 2023-12-31

## 2024-01-02 ENCOUNTER — NON-APPOINTMENT (OUTPATIENT)
Age: 89
End: 2024-01-02

## 2024-03-19 RX ORDER — GABAPENTIN 100 MG/1
100 CAPSULE ORAL 3 TIMES DAILY
Qty: 270 | Refills: 1 | Status: ACTIVE | COMMUNITY
Start: 2022-05-27 | End: 1900-01-01

## 2024-05-13 ENCOUNTER — APPOINTMENT (OUTPATIENT)
Dept: FAMILY MEDICINE | Facility: CLINIC | Age: 89
End: 2024-05-13
Payer: MEDICARE

## 2024-05-13 VITALS
SYSTOLIC BLOOD PRESSURE: 120 MMHG | OXYGEN SATURATION: 97 % | BODY MASS INDEX: 22.27 KG/M2 | HEART RATE: 84 BPM | WEIGHT: 138 LBS | TEMPERATURE: 97.7 F | DIASTOLIC BLOOD PRESSURE: 64 MMHG

## 2024-05-13 DIAGNOSIS — R26.81 UNSTEADINESS ON FEET: ICD-10-CM

## 2024-05-13 DIAGNOSIS — E11.9 TYPE 2 DIABETES MELLITUS W/OUT COMPLICATIONS: ICD-10-CM

## 2024-05-13 DIAGNOSIS — I10 ESSENTIAL (PRIMARY) HYPERTENSION: ICD-10-CM

## 2024-05-13 DIAGNOSIS — E78.5 HYPERLIPIDEMIA, UNSPECIFIED: ICD-10-CM

## 2024-05-13 PROCEDURE — 99214 OFFICE O/P EST MOD 30 MIN: CPT

## 2024-05-13 PROCEDURE — 36415 COLL VENOUS BLD VENIPUNCTURE: CPT

## 2024-05-13 NOTE — HISTORY OF PRESENT ILLNESS
[FreeTextEntry1] : pt here for f/u visit [de-identified] : admits to fall 2 months ago - wasnt paying attention when walking - no LOC - noticed feeling more unsteady with walking no chest pain, no sob, no cough, no fever, no dizziness, no abdominal pain, no n/v/d/c/melena/brbpr/hematuria/dysuria

## 2024-05-13 NOTE — PHYSICAL EXAM
[No Acute Distress] : no acute distress [Well Nourished] : well nourished [Well Developed] : well developed [Well-Appearing] : well-appearing [Normal Voice/Communication] : normal voice/communication [Normal Sclera/Conjunctiva] : normal sclera/conjunctiva [Normal Outer Ear/Nose] : the outer ears and nose were normal in appearance [No JVD] : no jugular venous distention [No Respiratory Distress] : no respiratory distress  [No Accessory Muscle Use] : no accessory muscle use [Clear to Auscultation] : lungs were clear to auscultation bilaterally [Normal Rate] : normal rate  [Regular Rhythm] : with a regular rhythm [Normal S1, S2] : normal S1 and S2 [No Edema] : there was no peripheral edema [Soft] : abdomen soft [Non Tender] : non-tender [Non-distended] : non-distended [No Masses] : no abdominal mass palpated [No HSM] : no HSM [Normal Bowel Sounds] : normal bowel sounds [No Spinal Tenderness] : no spinal tenderness [No Joint Swelling] : no joint swelling [Grossly Normal Strength/Tone] : grossly normal strength/tone [No Rash] : no rash [Coordination Grossly Intact] : coordination grossly intact [No Focal Deficits] : no focal deficits [Normal Gait] : normal gait [Speech Grossly Normal] : speech grossly normal [Normal Affect] : the affect was normal [Alert and Oriented x3] : oriented to person, place, and time [Normal Mood] : the mood was normal [Normal Insight/Judgement] : insight and judgment were intact

## 2024-05-16 LAB
ALBUMIN SERPL ELPH-MCNC: 4.5 G/DL
ALP BLD-CCNC: 87 U/L
ALT SERPL-CCNC: 26 U/L
ANION GAP SERPL CALC-SCNC: 15 MMOL/L
APPEARANCE: CLEAR
AST SERPL-CCNC: 23 U/L
BACTERIA: NEGATIVE /HPF
BASOPHILS # BLD AUTO: 0.05 K/UL
BASOPHILS NFR BLD AUTO: 0.9 %
BILIRUB SERPL-MCNC: 0.6 MG/DL
BILIRUBIN URINE: NEGATIVE
BLOOD URINE: NEGATIVE
BUN SERPL-MCNC: 13 MG/DL
CALCIUM SERPL-MCNC: 10.2 MG/DL
CAST: 1 /LPF
CHLORIDE SERPL-SCNC: 96 MMOL/L
CHOLEST SERPL-MCNC: 155 MG/DL
CO2 SERPL-SCNC: 23 MMOL/L
COLOR: YELLOW
CREAT SERPL-MCNC: 0.71 MG/DL
EGFR: 82 ML/MIN/1.73M2
EOSINOPHIL # BLD AUTO: 0.08 K/UL
EOSINOPHIL NFR BLD AUTO: 1.5 %
EPITHELIAL CELLS: 1 /HPF
ESTIMATED AVERAGE GLUCOSE: 120 MG/DL
GLUCOSE QUALITATIVE U: NEGATIVE MG/DL
GLUCOSE SERPL-MCNC: 122 MG/DL
HBA1C MFR BLD HPLC: 5.8 %
HCT VFR BLD CALC: 40.9 %
HDLC SERPL-MCNC: 86 MG/DL
HGB BLD-MCNC: 13.5 G/DL
IMM GRANULOCYTES NFR BLD AUTO: 0.2 %
KETONES URINE: NEGATIVE MG/DL
LDLC SERPL CALC-MCNC: 53 MG/DL
LEUKOCYTE ESTERASE URINE: NEGATIVE
LYMPHOCYTES # BLD AUTO: 1.43 K/UL
LYMPHOCYTES NFR BLD AUTO: 26.1 %
MAN DIFF?: NORMAL
MCHC RBC-ENTMCNC: 30.1 PG
MCHC RBC-ENTMCNC: 33 GM/DL
MCV RBC AUTO: 91.3 FL
MICROSCOPIC-UA: NORMAL
MONOCYTES # BLD AUTO: 0.41 K/UL
MONOCYTES NFR BLD AUTO: 7.5 %
NEUTROPHILS # BLD AUTO: 3.5 K/UL
NEUTROPHILS NFR BLD AUTO: 63.8 %
NITRITE URINE: NEGATIVE
NONHDLC SERPL-MCNC: 69 MG/DL
PH URINE: 7
PLATELET # BLD AUTO: 309 K/UL
POTASSIUM SERPL-SCNC: 4.4 MMOL/L
PROT SERPL-MCNC: 6.8 G/DL
PROTEIN URINE: NEGATIVE MG/DL
RBC # BLD: 4.48 M/UL
RBC # FLD: 13.8 %
RED BLOOD CELLS URINE: 1 /HPF
SODIUM SERPL-SCNC: 134 MMOL/L
SPECIFIC GRAVITY URINE: 1.01
TRIGL SERPL-MCNC: 83 MG/DL
UROBILINOGEN URINE: 0.2 MG/DL
WBC # FLD AUTO: 5.48 K/UL
WHITE BLOOD CELLS URINE: 0 /HPF

## 2024-05-20 LAB
ANION GAP SERPL CALC-SCNC: 11 MMOL/L
BUN SERPL-MCNC: 17 MG/DL
CALCIUM SERPL-MCNC: 10 MG/DL
CHLORIDE SERPL-SCNC: 98 MMOL/L
CO2 SERPL-SCNC: 26 MMOL/L
CREAT SERPL-MCNC: 0.78 MG/DL
EGFR: 73 ML/MIN/1.73M2
GLUCOSE SERPL-MCNC: 118 MG/DL
POTASSIUM SERPL-SCNC: 4.3 MMOL/L
SODIUM SERPL-SCNC: 135 MMOL/L

## 2024-08-18 ENCOUNTER — NON-APPOINTMENT (OUTPATIENT)
Age: 89
End: 2024-08-18

## 2024-09-06 ENCOUNTER — OFFICE (OUTPATIENT)
Dept: URBAN - METROPOLITAN AREA CLINIC 12 | Facility: CLINIC | Age: 89
Setting detail: OPHTHALMOLOGY
End: 2024-09-06
Payer: MEDICARE

## 2024-09-06 DIAGNOSIS — H02.403: ICD-10-CM

## 2024-09-06 DIAGNOSIS — H43.393: ICD-10-CM

## 2024-09-06 DIAGNOSIS — H35.373: ICD-10-CM

## 2024-09-06 DIAGNOSIS — D31.31: ICD-10-CM

## 2024-09-06 DIAGNOSIS — H18.513: ICD-10-CM

## 2024-09-06 DIAGNOSIS — H35.363: ICD-10-CM

## 2024-09-06 DIAGNOSIS — D31.30: ICD-10-CM

## 2024-09-06 DIAGNOSIS — H43.813: ICD-10-CM

## 2024-09-06 DIAGNOSIS — H16.223: ICD-10-CM

## 2024-09-06 PROCEDURE — 92014 COMPRE OPH EXAM EST PT 1/>: CPT | Performed by: OPHTHALMOLOGY

## 2024-09-06 PROCEDURE — 92134 CPTRZ OPH DX IMG PST SGM RTA: CPT | Performed by: OPHTHALMOLOGY

## 2024-09-06 PROCEDURE — 92286 ANT SGM IMG I&R SPECLR MIC: CPT | Performed by: OPHTHALMOLOGY

## 2024-09-06 ASSESSMENT — CONFRONTATIONAL VISUAL FIELD TEST (CVF)
OD_FINDINGS: FULL
OS_FINDINGS: FULL

## 2024-09-06 ASSESSMENT — LID POSITION - PTOSIS
OD_PTOSIS: RUL 1+ 2+
OS_PTOSIS: LUL 1+ 2+

## 2024-09-09 NOTE — DISCUSSION/SUMMARY
[de-identified] : Referred patient to physical therapy for strengthening. \par \par Cortisone Shoulder Injection - LEFT Rotator Cuff \par The risks, benefits, and alternatives to cortisone injection were explained in full to the patient.  Risks outlined include but are not limited to infection, sepsis, bleeding, scarring, skin discoloration, temporary increase in pain, syncopal episode, failure to resolve symptoms, allergic reaction, symptom recurrence, and elevation of blood sugar in diabetics.  Patient understood the risks.  All questions were answered.  After discussion of options, patient requested an injection.  Oral informed consent was obtained and sterile prep was done of the injection site.  A mixture of 40mg of Kenalog, 3cc of 1% Lidocaine was sterilely prepared by me.  Sterile technique was used to introduce the mixture into the left shoulder. Ultrasound was used for proper needle placement.  Patient tolerated the procedure well.  Advised to ice the injection site this evening. \par \par Referred to [facility] for left shoulder injection, glenohumerally\par -----------------------------------------------\par Home Exercise\par The patient is instructed on a home exercise program.\par \par DELIA ARCHIBALD Acting as a Scribe for Dr. Hendricks\par I, Delia Archibald, attest that this documentation has been prepared under the direction and in the presence of Provider Philip Hendricks MD.\par \par Activity Modification\par The patient was advised to modify their activities.\par \par Dx / Natural History\par The patient was advised of the diagnosis.  The natural history of the pathology was explained in full to the patient in layman's terms.  Several different treatment options were discussed and explained in full to the patient including the risks and benefits of both surgical and non-surgical treatments.  All questions and concerns were answered.\par \par Pain Guide Activities\par The patient was advised to let pain guide the gradual advancement of activities.\par \par RICE\lisa I explained to the patient that rest, ice, compression, and elevation would benefit them.  They may return to activity after follow-up or when they no longer have any pain. \par  [FreeTextEntry1] : Pleasant and cooperative with exam, appropriate for age. Ambulates without evidence of antalgia and limp, good coordination and balance. AAOX3  Skin: No rashes noted.  Eyes: Both conjunctiva, eyelids and pupils are present.  ENT:  Both ears, nose and lips are present. No nasal congestion.  Resp: No cough or wheezing noted.  Right long arm cast is fitting well and looks clinically well aligned. The padding is intact with no signs of skin irritation. No pain with passive extension of the digits. Neurologically intact with full sensation to palpation. Capillary refill less than 2 seconds. There is no swelling or lymph edema noted. 5\5 muscle strength in fingers, EPL, 1st DI, FDP to index.   No joint instability noted with ROM testing at shoulder. ROM about the digits is full.

## 2024-09-20 ENCOUNTER — RX RENEWAL (OUTPATIENT)
Age: 89
End: 2024-09-20

## 2024-10-11 ENCOUNTER — APPOINTMENT (OUTPATIENT)
Dept: INTERNAL MEDICINE | Facility: CLINIC | Age: 89
End: 2024-10-11
Payer: MEDICARE

## 2024-10-11 VITALS
SYSTOLIC BLOOD PRESSURE: 122 MMHG | DIASTOLIC BLOOD PRESSURE: 70 MMHG | OXYGEN SATURATION: 98 % | BODY MASS INDEX: 23.08 KG/M2 | HEART RATE: 68 BPM | TEMPERATURE: 97.3 F | WEIGHT: 143 LBS

## 2024-10-11 DIAGNOSIS — E78.5 HYPERLIPIDEMIA, UNSPECIFIED: ICD-10-CM

## 2024-10-11 DIAGNOSIS — E11.9 TYPE 2 DIABETES MELLITUS W/OUT COMPLICATIONS: ICD-10-CM

## 2024-10-11 DIAGNOSIS — Z23 ENCOUNTER FOR IMMUNIZATION: ICD-10-CM

## 2024-10-11 DIAGNOSIS — I10 ESSENTIAL (PRIMARY) HYPERTENSION: ICD-10-CM

## 2024-10-11 DIAGNOSIS — Z12.39 ENCOUNTER FOR OTHER SCREENING FOR MALIGNANT NEOPLASM OF BREAST: ICD-10-CM

## 2024-10-11 PROCEDURE — 99214 OFFICE O/P EST MOD 30 MIN: CPT

## 2024-10-11 PROCEDURE — G2211 COMPLEX E/M VISIT ADD ON: CPT

## 2024-10-11 PROCEDURE — 90662 IIV NO PRSV INCREASED AG IM: CPT

## 2024-10-11 PROCEDURE — G0008: CPT

## 2024-10-17 LAB
ALBUMIN SERPL ELPH-MCNC: 4.3 G/DL
ALP BLD-CCNC: 98 U/L
ALT SERPL-CCNC: 16 U/L
ANION GAP SERPL CALC-SCNC: 12 MMOL/L
APPEARANCE: CLEAR
AST SERPL-CCNC: 18 U/L
BACTERIA: NEGATIVE /HPF
BASOPHILS # BLD AUTO: 0.04 K/UL
BASOPHILS NFR BLD AUTO: 0.7 %
BILIRUB SERPL-MCNC: 0.5 MG/DL
BILIRUBIN URINE: NEGATIVE
BLOOD URINE: NEGATIVE
BUN SERPL-MCNC: 12 MG/DL
CALCIUM SERPL-MCNC: 9.7 MG/DL
CAST: 0 /LPF
CHLORIDE SERPL-SCNC: 95 MMOL/L
CHOLEST SERPL-MCNC: 141 MG/DL
CO2 SERPL-SCNC: 27 MMOL/L
COLOR: YELLOW
CREAT SERPL-MCNC: 0.73 MG/DL
EGFR: 79 ML/MIN/1.73M2
EOSINOPHIL # BLD AUTO: 0.09 K/UL
EOSINOPHIL NFR BLD AUTO: 1.5 %
EPITHELIAL CELLS: 2 /HPF
ESTIMATED AVERAGE GLUCOSE: 126 MG/DL
GLUCOSE QUALITATIVE U: NEGATIVE MG/DL
GLUCOSE SERPL-MCNC: 108 MG/DL
HBA1C MFR BLD HPLC: 6 %
HCT VFR BLD CALC: 40.4 %
HDLC SERPL-MCNC: 82 MG/DL
HGB BLD-MCNC: 13.1 G/DL
IMM GRANULOCYTES NFR BLD AUTO: 0.3 %
KETONES URINE: NEGATIVE MG/DL
LDLC SERPL CALC-MCNC: 44 MG/DL
LEUKOCYTE ESTERASE URINE: ABNORMAL
LYMPHOCYTES # BLD AUTO: 1.49 K/UL
LYMPHOCYTES NFR BLD AUTO: 24.3 %
MAN DIFF?: NORMAL
MCHC RBC-ENTMCNC: 30 PG
MCHC RBC-ENTMCNC: 32.4 GM/DL
MCV RBC AUTO: 92.7 FL
MICROSCOPIC-UA: NORMAL
MONOCYTES # BLD AUTO: 0.44 K/UL
MONOCYTES NFR BLD AUTO: 7.2 %
NEUTROPHILS # BLD AUTO: 4.06 K/UL
NEUTROPHILS NFR BLD AUTO: 66 %
NITRITE URINE: NEGATIVE
NONHDLC SERPL-MCNC: 59 MG/DL
PH URINE: 7
PLATELET # BLD AUTO: 299 K/UL
POTASSIUM SERPL-SCNC: 4.4 MMOL/L
PROT SERPL-MCNC: 6.5 G/DL
PROTEIN URINE: NEGATIVE MG/DL
RBC # BLD: 4.36 M/UL
RBC # FLD: 13.3 %
RED BLOOD CELLS URINE: 1 /HPF
SODIUM SERPL-SCNC: 134 MMOL/L
SPECIFIC GRAVITY URINE: 1.01
TRIGL SERPL-MCNC: 77 MG/DL
TSH SERPL-ACNC: 2.32 UIU/ML
UROBILINOGEN URINE: 0.2 MG/DL
WBC # FLD AUTO: 6.14 K/UL
WHITE BLOOD CELLS URINE: 5 /HPF

## 2024-11-04 ENCOUNTER — APPOINTMENT (OUTPATIENT)
Dept: CARDIOLOGY | Facility: CLINIC | Age: 89
End: 2024-11-04
Payer: MEDICARE

## 2024-11-04 ENCOUNTER — NON-APPOINTMENT (OUTPATIENT)
Age: 89
End: 2024-11-04

## 2024-11-04 VITALS
BODY MASS INDEX: 22.5 KG/M2 | HEART RATE: 96 BPM | DIASTOLIC BLOOD PRESSURE: 72 MMHG | OXYGEN SATURATION: 98 % | HEIGHT: 66 IN | SYSTOLIC BLOOD PRESSURE: 126 MMHG | WEIGHT: 140 LBS

## 2024-11-04 DIAGNOSIS — I10 ESSENTIAL (PRIMARY) HYPERTENSION: ICD-10-CM

## 2024-11-04 DIAGNOSIS — I35.0 NONRHEUMATIC AORTIC (VALVE) STENOSIS: ICD-10-CM

## 2024-11-04 DIAGNOSIS — E78.5 HYPERLIPIDEMIA, UNSPECIFIED: ICD-10-CM

## 2024-11-04 DIAGNOSIS — Z82.49 FAMILY HISTORY OF ISCHEMIC HEART DISEASE AND OTHER DISEASES OF THE CIRCULATORY SYSTEM: ICD-10-CM

## 2024-11-04 PROCEDURE — 99204 OFFICE O/P NEW MOD 45 MIN: CPT | Mod: 25

## 2024-11-04 PROCEDURE — 93000 ELECTROCARDIOGRAM COMPLETE: CPT

## 2024-12-09 ENCOUNTER — APPOINTMENT (OUTPATIENT)
Dept: INTERNAL MEDICINE | Facility: CLINIC | Age: 89
End: 2024-12-09
Payer: MEDICARE

## 2024-12-09 VITALS
OXYGEN SATURATION: 97 % | HEART RATE: 90 BPM | DIASTOLIC BLOOD PRESSURE: 76 MMHG | TEMPERATURE: 97.3 F | BODY MASS INDEX: 23.08 KG/M2 | WEIGHT: 143 LBS | SYSTOLIC BLOOD PRESSURE: 138 MMHG

## 2024-12-09 DIAGNOSIS — Z12.39 ENCOUNTER FOR OTHER SCREENING FOR MALIGNANT NEOPLASM OF BREAST: ICD-10-CM

## 2024-12-09 DIAGNOSIS — Z12.83 ENCOUNTER FOR SCREENING FOR MALIGNANT NEOPLASM OF SKIN: ICD-10-CM

## 2024-12-09 DIAGNOSIS — E78.5 HYPERLIPIDEMIA, UNSPECIFIED: ICD-10-CM

## 2024-12-09 DIAGNOSIS — E11.9 TYPE 2 DIABETES MELLITUS W/OUT COMPLICATIONS: ICD-10-CM

## 2024-12-09 DIAGNOSIS — I10 ESSENTIAL (PRIMARY) HYPERTENSION: ICD-10-CM

## 2024-12-09 PROCEDURE — 99214 OFFICE O/P EST MOD 30 MIN: CPT

## 2024-12-09 PROCEDURE — G2211 COMPLEX E/M VISIT ADD ON: CPT

## 2024-12-12 ENCOUNTER — APPOINTMENT (OUTPATIENT)
Dept: DERMATOLOGY | Facility: CLINIC | Age: 89
End: 2024-12-12
Payer: MEDICARE

## 2024-12-12 DIAGNOSIS — C44.91 BASAL CELL CARCINOMA OF SKIN, UNSPECIFIED: ICD-10-CM

## 2024-12-12 DIAGNOSIS — L82.1 OTHER SEBORRHEIC KERATOSIS: ICD-10-CM

## 2024-12-12 DIAGNOSIS — L81.4 OTHER MELANIN HYPERPIGMENTATION: ICD-10-CM

## 2024-12-12 PROCEDURE — 99204 OFFICE O/P NEW MOD 45 MIN: CPT

## 2024-12-13 LAB
ALBUMIN SERPL ELPH-MCNC: 4.6 G/DL
ALP BLD-CCNC: 98 U/L
ALT SERPL-CCNC: 24 U/L
ANION GAP SERPL CALC-SCNC: 10 MMOL/L
APPEARANCE: CLEAR
AST SERPL-CCNC: 21 U/L
BACTERIA: NEGATIVE /HPF
BASOPHILS # BLD AUTO: 0.04 K/UL
BASOPHILS NFR BLD AUTO: 0.6 %
BILIRUB SERPL-MCNC: 0.5 MG/DL
BILIRUBIN URINE: NEGATIVE
BLOOD URINE: NEGATIVE
BUN SERPL-MCNC: 15 MG/DL
CALCIUM SERPL-MCNC: 10.4 MG/DL
CAST: 0 /LPF
CHLORIDE SERPL-SCNC: 97 MMOL/L
CHOLEST SERPL-MCNC: 160 MG/DL
CO2 SERPL-SCNC: 29 MMOL/L
COLOR: YELLOW
CREAT SERPL-MCNC: 0.87 MG/DL
EGFR: 64 ML/MIN/1.73M2
EOSINOPHIL # BLD AUTO: 0.07 K/UL
EOSINOPHIL NFR BLD AUTO: 1.1 %
EPITHELIAL CELLS: 2 /HPF
ESTIMATED AVERAGE GLUCOSE: 128 MG/DL
GLUCOSE QUALITATIVE U: NEGATIVE MG/DL
GLUCOSE SERPL-MCNC: 124 MG/DL
HBA1C MFR BLD HPLC: 6.1 %
HCT VFR BLD CALC: 42.6 %
HDLC SERPL-MCNC: 85 MG/DL
HGB BLD-MCNC: 13.7 G/DL
IMM GRANULOCYTES NFR BLD AUTO: 0.2 %
KETONES URINE: NEGATIVE MG/DL
LDLC SERPL CALC-MCNC: 59 MG/DL
LEUKOCYTE ESTERASE URINE: ABNORMAL
LYMPHOCYTES # BLD AUTO: 1.65 K/UL
LYMPHOCYTES NFR BLD AUTO: 26.1 %
MAN DIFF?: NORMAL
MCHC RBC-ENTMCNC: 29.5 PG
MCHC RBC-ENTMCNC: 32.2 G/DL
MCV RBC AUTO: 91.6 FL
MICROSCOPIC-UA: NORMAL
MONOCYTES # BLD AUTO: 0.42 K/UL
MONOCYTES NFR BLD AUTO: 6.7 %
NEUTROPHILS # BLD AUTO: 4.12 K/UL
NEUTROPHILS NFR BLD AUTO: 65.3 %
NITRITE URINE: NEGATIVE
NONHDLC SERPL-MCNC: 75 MG/DL
OSMOLALITY SERPL: 283 MOSMOL/KG
OSMOLALITY UR: 516 MOSM/KG
PH URINE: 6
PLATELET # BLD AUTO: 314 K/UL
POTASSIUM SERPL-SCNC: 4.5 MMOL/L
PROT SERPL-MCNC: 7.2 G/DL
PROTEIN URINE: NEGATIVE MG/DL
RBC # BLD: 4.65 M/UL
RBC # FLD: 13.5 %
RED BLOOD CELLS URINE: 1 /HPF
SODIUM ?TM SUB UR QN: 41 MMOL/L
SODIUM SERPL-SCNC: 136 MMOL/L
SPECIFIC GRAVITY URINE: 1.02
TRIGL SERPL-MCNC: 86 MG/DL
URATE SERPL-MCNC: 5.8 MG/DL
UROBILINOGEN URINE: 0.2 MG/DL
WBC # FLD AUTO: 6.31 K/UL
WHITE BLOOD CELLS URINE: 6 /HPF

## 2025-05-09 ENCOUNTER — RX RENEWAL (OUTPATIENT)
Age: 89
End: 2025-05-09

## 2025-05-12 ENCOUNTER — RX RENEWAL (OUTPATIENT)
Age: 89
End: 2025-05-12

## 2025-05-14 ENCOUNTER — APPOINTMENT (OUTPATIENT)
Dept: CARDIOLOGY | Facility: CLINIC | Age: 89
End: 2025-05-14
Payer: MEDICARE

## 2025-05-14 ENCOUNTER — NON-APPOINTMENT (OUTPATIENT)
Age: 89
End: 2025-05-14

## 2025-05-14 VITALS
HEART RATE: 82 BPM | WEIGHT: 140 LBS | OXYGEN SATURATION: 97 % | HEIGHT: 66 IN | DIASTOLIC BLOOD PRESSURE: 86 MMHG | SYSTOLIC BLOOD PRESSURE: 144 MMHG | BODY MASS INDEX: 22.5 KG/M2

## 2025-05-14 DIAGNOSIS — R00.2 PALPITATIONS: ICD-10-CM

## 2025-05-14 PROCEDURE — 93246 EXT ECG>7D<15D RECORDING: CPT

## 2025-05-14 PROCEDURE — 99214 OFFICE O/P EST MOD 30 MIN: CPT | Mod: 25

## 2025-05-14 PROCEDURE — 93000 ELECTROCARDIOGRAM COMPLETE: CPT | Mod: XU

## 2025-05-19 ENCOUNTER — RX RENEWAL (OUTPATIENT)
Age: 89
End: 2025-05-19

## 2025-05-20 ENCOUNTER — APPOINTMENT (OUTPATIENT)
Dept: INTERNAL MEDICINE | Facility: CLINIC | Age: 89
End: 2025-05-20

## 2025-05-20 VITALS
TEMPERATURE: 97 F | HEART RATE: 74 BPM | OXYGEN SATURATION: 98 % | WEIGHT: 143 LBS | HEIGHT: 66 IN | SYSTOLIC BLOOD PRESSURE: 114 MMHG | DIASTOLIC BLOOD PRESSURE: 78 MMHG | BODY MASS INDEX: 22.98 KG/M2

## 2025-05-20 DIAGNOSIS — R73.09 OTHER ABNORMAL GLUCOSE: ICD-10-CM

## 2025-05-20 DIAGNOSIS — I10 ESSENTIAL (PRIMARY) HYPERTENSION: ICD-10-CM

## 2025-05-20 DIAGNOSIS — E78.5 HYPERLIPIDEMIA, UNSPECIFIED: ICD-10-CM

## 2025-05-20 DIAGNOSIS — Z12.39 ENCOUNTER FOR OTHER SCREENING FOR MALIGNANT NEOPLASM OF BREAST: ICD-10-CM

## 2025-05-20 PROCEDURE — 99214 OFFICE O/P EST MOD 30 MIN: CPT

## 2025-05-20 PROCEDURE — G2211 COMPLEX E/M VISIT ADD ON: CPT

## 2025-05-21 ENCOUNTER — APPOINTMENT (OUTPATIENT)
Dept: CARDIOLOGY | Facility: CLINIC | Age: 89
End: 2025-05-21
Payer: MEDICARE

## 2025-05-21 PROCEDURE — 93306 TTE W/DOPPLER COMPLETE: CPT

## 2025-05-22 LAB
ALBUMIN SERPL ELPH-MCNC: 4.2 G/DL
ALP BLD-CCNC: 97 U/L
ALT SERPL-CCNC: 27 U/L
ANION GAP SERPL CALC-SCNC: 15 MMOL/L
APPEARANCE: CLEAR
AST SERPL-CCNC: 21 U/L
BACTERIA: NEGATIVE /HPF
BASOPHILS # BLD AUTO: 0.04 K/UL
BASOPHILS NFR BLD AUTO: 0.6 %
BILIRUB SERPL-MCNC: 0.3 MG/DL
BILIRUBIN URINE: NEGATIVE
BLOOD URINE: NEGATIVE
BUN SERPL-MCNC: 15 MG/DL
CALCIUM SERPL-MCNC: 10 MG/DL
CAST: 0 /LPF
CHLORIDE SERPL-SCNC: 96 MMOL/L
CHOLEST SERPL-MCNC: 151 MG/DL
CO2 SERPL-SCNC: 26 MMOL/L
COLOR: YELLOW
CREAT SERPL-MCNC: 0.78 MG/DL
EGFRCR SERPLBLD CKD-EPI 2021: 73 ML/MIN/1.73M2
EOSINOPHIL # BLD AUTO: 0.1 K/UL
EOSINOPHIL NFR BLD AUTO: 1.5 %
EPITHELIAL CELLS: 3 /HPF
ESTIMATED AVERAGE GLUCOSE: 123 MG/DL
GLUCOSE QUALITATIVE U: NEGATIVE MG/DL
GLUCOSE SERPL-MCNC: 108 MG/DL
HBA1C MFR BLD HPLC: 5.9 %
HCT VFR BLD CALC: 41.4 %
HDLC SERPL-MCNC: 79 MG/DL
HGB BLD-MCNC: 12.9 G/DL
IMM GRANULOCYTES NFR BLD AUTO: 0.1 %
KETONES URINE: NEGATIVE MG/DL
LDLC SERPL-MCNC: 50 MG/DL
LEUKOCYTE ESTERASE URINE: ABNORMAL
LYMPHOCYTES # BLD AUTO: 1.38 K/UL
LYMPHOCYTES NFR BLD AUTO: 20.7 %
MAN DIFF?: NORMAL
MCHC RBC-ENTMCNC: 29.6 PG
MCHC RBC-ENTMCNC: 31.2 G/DL
MCV RBC AUTO: 95 FL
MICROSCOPIC-UA: NORMAL
MONOCYTES # BLD AUTO: 0.58 K/UL
MONOCYTES NFR BLD AUTO: 8.7 %
NEUTROPHILS # BLD AUTO: 4.57 K/UL
NEUTROPHILS NFR BLD AUTO: 68.4 %
NITRITE URINE: NEGATIVE
NONHDLC SERPL-MCNC: 71 MG/DL
PH URINE: 6.5
PLATELET # BLD AUTO: 297 K/UL
POTASSIUM SERPL-SCNC: 4.8 MMOL/L
PROT SERPL-MCNC: 6.8 G/DL
PROTEIN URINE: NEGATIVE MG/DL
RBC # BLD: 4.36 M/UL
RBC # FLD: 13.8 %
RED BLOOD CELLS URINE: 1 /HPF
SODIUM SERPL-SCNC: 137 MMOL/L
SPECIFIC GRAVITY URINE: 1.02
TRIGL SERPL-MCNC: 126 MG/DL
UROBILINOGEN URINE: 1 MG/DL
WBC # FLD AUTO: 6.68 K/UL
WHITE BLOOD CELLS URINE: 7 /HPF

## 2025-06-09 PROCEDURE — 93248 EXT ECG>7D<15D REV&INTERPJ: CPT

## 2025-06-16 ENCOUNTER — RX RENEWAL (OUTPATIENT)
Age: 89
End: 2025-06-16

## 2025-06-20 ENCOUNTER — OUTPATIENT (OUTPATIENT)
Dept: OUTPATIENT SERVICES | Facility: HOSPITAL | Age: 89
LOS: 1 days | End: 2025-06-20
Payer: MEDICARE

## 2025-06-20 ENCOUNTER — RESULT REVIEW (OUTPATIENT)
Age: 89
End: 2025-06-20

## 2025-06-20 ENCOUNTER — APPOINTMENT (OUTPATIENT)
Dept: MAMMOGRAPHY | Facility: CLINIC | Age: 89
End: 2025-06-20
Payer: MEDICARE

## 2025-06-20 ENCOUNTER — APPOINTMENT (OUTPATIENT)
Dept: ULTRASOUND IMAGING | Facility: CLINIC | Age: 89
End: 2025-06-20
Payer: MEDICARE

## 2025-06-20 DIAGNOSIS — Z98.890 OTHER SPECIFIED POSTPROCEDURAL STATES: Chronic | ICD-10-CM

## 2025-06-20 DIAGNOSIS — Z90.710 ACQUIRED ABSENCE OF BOTH CERVIX AND UTERUS: Chronic | ICD-10-CM

## 2025-06-20 DIAGNOSIS — Z90.49 ACQUIRED ABSENCE OF OTHER SPECIFIED PARTS OF DIGESTIVE TRACT: Chronic | ICD-10-CM

## 2025-06-20 DIAGNOSIS — Z12.39 ENCOUNTER FOR OTHER SCREENING FOR MALIGNANT NEOPLASM OF BREAST: ICD-10-CM

## 2025-06-20 DIAGNOSIS — Z41.9 ENCOUNTER FOR PROCEDURE FOR PURPOSES OTHER THAN REMEDYING HEALTH STATE, UNSPECIFIED: Chronic | ICD-10-CM

## 2025-06-20 PROCEDURE — 77063 BREAST TOMOSYNTHESIS BI: CPT

## 2025-06-20 PROCEDURE — 76641 ULTRASOUND BREAST COMPLETE: CPT

## 2025-06-20 PROCEDURE — 76641 ULTRASOUND BREAST COMPLETE: CPT | Mod: 26,50,GA

## 2025-06-20 PROCEDURE — 77063 BREAST TOMOSYNTHESIS BI: CPT | Mod: 26

## 2025-06-20 PROCEDURE — 77067 SCR MAMMO BI INCL CAD: CPT

## 2025-06-20 PROCEDURE — 77067 SCR MAMMO BI INCL CAD: CPT | Mod: 26

## 2025-07-17 ENCOUNTER — APPOINTMENT (OUTPATIENT)
Dept: CARDIOLOGY | Facility: CLINIC | Age: 89
End: 2025-07-17
Payer: MEDICARE

## 2025-07-17 PROCEDURE — 93242 EXT ECG>48HR<7D RECORDING: CPT

## 2025-07-29 PROCEDURE — 93244 EXT ECG>48HR<7D REV&INTERPJ: CPT

## 2025-07-31 ENCOUNTER — APPOINTMENT (OUTPATIENT)
Dept: CARDIOLOGY | Facility: CLINIC | Age: 89
End: 2025-07-31
Payer: MEDICARE

## 2025-07-31 VITALS
BODY MASS INDEX: 23.14 KG/M2 | HEART RATE: 78 BPM | OXYGEN SATURATION: 98 % | HEIGHT: 66 IN | WEIGHT: 144 LBS | DIASTOLIC BLOOD PRESSURE: 64 MMHG | SYSTOLIC BLOOD PRESSURE: 110 MMHG

## 2025-07-31 DIAGNOSIS — I10 ESSENTIAL (PRIMARY) HYPERTENSION: ICD-10-CM

## 2025-07-31 DIAGNOSIS — E78.5 HYPERLIPIDEMIA, UNSPECIFIED: ICD-10-CM

## 2025-07-31 PROCEDURE — 99214 OFFICE O/P EST MOD 30 MIN: CPT | Mod: 25

## 2025-07-31 PROCEDURE — 93000 ELECTROCARDIOGRAM COMPLETE: CPT

## 2025-08-06 ENCOUNTER — APPOINTMENT (OUTPATIENT)
Dept: CARDIOLOGY | Facility: CLINIC | Age: 89
End: 2025-08-06
Payer: MEDICARE

## 2025-08-06 PROCEDURE — 93880 EXTRACRANIAL BILAT STUDY: CPT

## 2025-08-22 ENCOUNTER — OFFICE (OUTPATIENT)
Dept: URBAN - METROPOLITAN AREA CLINIC 12 | Facility: CLINIC | Age: 89
Setting detail: OPHTHALMOLOGY
End: 2025-08-22
Payer: MEDICARE

## 2025-08-22 DIAGNOSIS — H43.393: ICD-10-CM

## 2025-08-22 DIAGNOSIS — H35.3131: ICD-10-CM

## 2025-08-22 DIAGNOSIS — H16.223: ICD-10-CM

## 2025-08-22 DIAGNOSIS — Z96.1: ICD-10-CM

## 2025-08-22 DIAGNOSIS — H35.373: ICD-10-CM

## 2025-08-22 DIAGNOSIS — D31.30: ICD-10-CM

## 2025-08-22 DIAGNOSIS — H18.513: ICD-10-CM

## 2025-08-22 DIAGNOSIS — D31.31: ICD-10-CM

## 2025-08-22 DIAGNOSIS — H02.403: ICD-10-CM

## 2025-08-22 DIAGNOSIS — H43.813: ICD-10-CM

## 2025-08-22 PROCEDURE — 92134 CPTRZ OPH DX IMG PST SGM RTA: CPT | Performed by: OPHTHALMOLOGY

## 2025-08-22 PROCEDURE — 92014 COMPRE OPH EXAM EST PT 1/>: CPT | Performed by: OPHTHALMOLOGY

## 2025-08-22 PROCEDURE — 92286 ANT SGM IMG I&R SPECLR MIC: CPT | Performed by: OPHTHALMOLOGY

## 2025-08-22 ASSESSMENT — REFRACTION_CURRENTRX
OS_OVR_VA: 20/
OD_VPRISM_DIRECTION: SV
OS_VPRISM_DIRECTION: BF
OD_OVR_VA: 20/
OD_VPRISM_DIRECTION: BF
OS_ADD: +2.50
OD_SPHERE: PLANO
OD_ADD: +2.50
OS_OVR_VA: 20/
OD_OVR_VA: 20/
OD_CYLINDER: SPHERE
OD_ADD: +2.75
OS_SPHERE: PLANO
OS_VPRISM_DIRECTION: SV
OS_CYLINDER: SPHERE
OS_ADD: +2.75

## 2025-08-22 ASSESSMENT — REFRACTION_AUTOREFRACTION
OS_SPHERE: +0.25
OS_AXIS: 126
OD_CYLINDER: -0.25
OS_CYLINDER: -0.50
OD_AXIS: 041
OD_SPHERE: PLANO

## 2025-08-22 ASSESSMENT — KERATOMETRY
OS_K1POWER_DIOPTERS: 45.00
OD_K1POWER_DIOPTERS: 44.25
OD_K2POWER_DIOPTERS: 47.00
OD_AXISANGLE_DEGREES: 088
OS_AXISANGLE_DEGREES: 079
METHOD_AUTO_MANUAL: AUTO
OS_K2POWER_DIOPTERS: 46.50

## 2025-08-22 ASSESSMENT — CONFRONTATIONAL VISUAL FIELD TEST (CVF)
OS_FINDINGS: FULL
OD_FINDINGS: FULL

## 2025-08-22 ASSESSMENT — REFRACTION_MANIFEST
OD_AXIS: 000
OS_CYLINDER: -0.25
OD_CYLINDER: SPHERE
OS_SPHERE: PLANO
OS_ADD: +2.50
OD_VA1: 20/20
OS_VA1: 20/20
OD_ADD: +2.50
OD_SPHERE: PLANO
OS_AXIS: 125

## 2025-08-22 ASSESSMENT — LID POSITION - PTOSIS
OS_PTOSIS: LUL 1+ 2+
OD_PTOSIS: RUL 1+ 2+

## 2025-08-22 ASSESSMENT — TONOMETRY
OS_IOP_MMHG: 10
OD_IOP_MMHG: 11

## 2025-08-22 ASSESSMENT — SUPERFICIAL PUNCTATE KERATITIS (SPK)
OD_SPK: 1+
OS_SPK: 1+

## 2025-08-22 ASSESSMENT — CORNEAL DYSTROPHY - POSTERIOR
OS_POSTERIORDYSTROPHY: 1+ GUTTATA
OD_POSTERIORDYSTROPHY: 1+ GUTTATA

## 2025-08-22 ASSESSMENT — VISUAL ACUITY
OD_BCVA: 20/20-2
OS_BCVA: 20/20

## 2025-08-29 ENCOUNTER — RX RENEWAL (OUTPATIENT)
Age: 89
End: 2025-08-29